# Patient Record
Sex: MALE | Race: BLACK OR AFRICAN AMERICAN | Employment: OTHER | URBAN - METROPOLITAN AREA
[De-identification: names, ages, dates, MRNs, and addresses within clinical notes are randomized per-mention and may not be internally consistent; named-entity substitution may affect disease eponyms.]

---

## 2017-03-15 RX ORDER — LOSARTAN POTASSIUM 50 MG/1
TABLET ORAL
Qty: 30 TAB | Refills: 11 | Status: SHIPPED | OUTPATIENT
Start: 2017-03-15 | End: 2018-03-15 | Stop reason: SDUPTHER

## 2017-04-24 ENCOUNTER — OFFICE VISIT (OUTPATIENT)
Dept: INTERNAL MEDICINE CLINIC | Age: 72
End: 2017-04-24

## 2017-04-24 VITALS
SYSTOLIC BLOOD PRESSURE: 133 MMHG | HEART RATE: 68 BPM | HEIGHT: 69 IN | OXYGEN SATURATION: 98 % | WEIGHT: 236 LBS | DIASTOLIC BLOOD PRESSURE: 68 MMHG | RESPIRATION RATE: 17 BRPM | TEMPERATURE: 97.7 F | BODY MASS INDEX: 34.96 KG/M2

## 2017-04-24 DIAGNOSIS — N40.1 BENIGN PROSTATIC HYPERPLASIA WITH LOWER URINARY TRACT SYMPTOMS, UNSPECIFIED MORPHOLOGY: ICD-10-CM

## 2017-04-24 DIAGNOSIS — E78.5 DYSLIPIDEMIA: ICD-10-CM

## 2017-04-24 DIAGNOSIS — I10 ESSENTIAL HYPERTENSION: ICD-10-CM

## 2017-04-24 DIAGNOSIS — Z00.00 ROUTINE GENERAL MEDICAL EXAMINATION AT A HEALTH CARE FACILITY: ICD-10-CM

## 2017-04-24 DIAGNOSIS — J44.9 CHRONIC OBSTRUCTIVE PULMONARY DISEASE, UNSPECIFIED COPD TYPE (HCC): ICD-10-CM

## 2017-04-24 DIAGNOSIS — R41.3 SHORT-TERM MEMORY LOSS: Primary | ICD-10-CM

## 2017-04-24 NOTE — MR AVS SNAPSHOT
Visit Information Date & Time Provider Department Dept. Phone Encounter #  
 4/24/2017  4:30 PM [facility] - [facility] [phone] [id number] Your Appointments 8/21/2017  4:45 PM  
Any with MD [facility] - [facility] ([facility]) Appt Note: 4 month f/u  
 [address], [facility] 260 [facility] 98  
  
   
 [address], [place] [address] [place] [address] Upcoming Health Maintenance Date Due DTaP/Tdap/Td series (1 - Tdap) 12/11/1964 Pneumococcal 65+ Low/Medium Risk (1 of 2 - PCV13) 12/11/2008 FOBT Q 1 YEAR AGE 50-75 6/1/2016 INFLUENZA AGE 9 TO ADULT 8/1/2016 MEDICARE YEARLY EXAM 2/8/2017 GLAUCOMA SCREENING Q2Y 6/1/2017 Allergies as of 4/24/2017  Review Complete On: 4/24/2017 By: Pap [place] No Known Allergies Current Immunizations  Reviewed on 2/22/2012 No immunizations on file. Not reviewed this visit You Were Diagnosed With   
  
 Codes Comments Essential hypertension    -  Primary ICD-10-CM: I10 
ICD-9-CM: 401.9 Chronic obstructive pulmonary disease, unspecified COPD type ([facility]     ICD-10-CM: J44.9 ICD-9-CM: 363 Short-term memory loss     ICD-10-CM: R41.3 ICD-9-CM: 780.93 Dyslipidemia     ICD-10-CM: E78.5 ICD-9-CM: 272.4 Prostatism     ICD-10-CM: N40.0 ICD-9-CM: 600.90 Benign prostatic hyperplasia with lower urinary tract symptoms, unspecified morphology     ICD-10-CM: N40.1 ICD-9-CM: 600.01 Vitals BP Pulse Temp Resp Height(growth percentile) Weight(growth percentile) 133/68 (BP 1 Location: Left arm, BP Patient Position: Sitting) 68 97.7 °F (36.5 °C) (Oral) 17 5' 9\" (1.753 m) 236 lb (107 kg) SpO2 BMI Smoking Status 98% 34.85 kg/m2 Current Some Day Smoker BMI and BSA Data Body Mass Index Body Surface Area 34.85 kg/m 2 2.28 m 2 Preferred Pharmacy Pharmacy Name Phone [id number] [address] [phone] Your Updated Medication List  
  
   
This list is accurate as of: 4/24/17  5:18 PM.  Always use your most recent med list.  
  
  
  
  
 aspirin 325 mg tablet Commonly known as:  ASPIRIN Take 325 mg by mouth daily. atorvastatin 80 mg tablet Commonly known as:  LIPITOR  
TAKE 1 TABLET BY MOUTH EVERY DAY  
  
 chlorhexidine 0.12 % solution Commonly known as:  PERIDEX  
  
 finasteride 5 mg tablet Commonly known as:  PROSCAR  
TAKE 1 TABLET BY MOUTH NIGHTLY  
  
 losartan 50 mg tablet Commonly known as:  COZAAR  
TAKE 1 TABLET BY MOUTH EVERY DAY  
  
 RAPAFLO 8 mg capsule Generic drug:  silodosin We Performed the Following AMB POC EKG ROUTINE W/ 12 LEADS, INTER & REP [03275 CPT(R)] APOLIPOPROTEIN B [id number] CPT(R)] CBC WITH AUTOMATED DIFF [69242 CPT(R)] LIPID PANEL [09287 CPT(R)] METABOLIC PANEL, COMPREHENSIVE [58860 CPT(R)] CT COLLECTION VENOUS BLOOD,VENIPUNCTURE [id number] CPT(R)] PROSTATE SPECIFIC AG (PSA) X5066677 CPT(R)] TSH 3RD GENERATION [99771 CPT(R)] URINALYSIS W/ RFLX MICROSCOPIC [61448 CPT(R)] VITAMIN B12 [id number] CPT(R)] Introducing [facility]! [place] introduces [employer] patient portal. Now you can access parts of your medical record, email your doctor's office, and request medication refills online. 1. In your internet browser, go to [email or web address] 2. Click on the First Time User? Click Here link in the Sign In box. You will see the New Member Sign Up page. 3. Enter your [employer] Access Code exactly as it appears below. You will not need to use this code after youve completed the sign-up process. If you do not sign up before the expiration date, you must request a new code. · [employer] Access Code: [email or web address] Expires: 7/23/2017  5:18 PM 
 
 4. Enter the last four digits of your Social Security Number (xxxx) and Date of Birth (mm/dd/yyyy) as indicated and click Submit. You will be taken to the next sign-up page. 5. Create a [employer] ID. This will be your [employer] login ID and cannot be changed, so think of one that is secure and easy to remember. 6. Create a [employer] password. You can change your password at any time. 7. Enter your Password Reset Question and Answer. This can be used at a later time if you forget your password. 8. Enter your e-mail address. You will receive e-mail notification when new information is available in [address] 9. Click Sign Up. You can now view and download portions of your medical record. 10. Click the Download Summary menu link to download a portable copy of your medical information. If you have questions, please visit the Frequently Asked Questions section of the [employer] website. Remember, [employer] is NOT to be used for urgent needs. For medical emergencies, dial 911. Now available from your iPhone and Android! Please provide this summary of care documentation to your next provider. Your primary care clinician is listed as Feli Marlow. If you have any questions after today's visit, please call [phone].

## 2017-04-24 NOTE — PROGRESS NOTES
1. Have you been to the ER, urgent care clinic since your last visit? Hospitalized since your last visit? No    2. Have you seen or consulted any other health care providers outside of the [address] [place] [address] since your last visit? Include any pap smears or colon screening.  No

## 2017-04-24 NOTE — PROGRESS NOTES
[address] [facility]  [facility]  [address] [facility] [address]  Phone:  [phone]  Fax: [phone]       Chief Complaint   Patient presents with    Physical     yearly visit   . SUBJECTIVE:    Dede Bautista is a 66 y.o. male comes in accompanied by his wife today. His concern is that of problems with his short term memory. His wife commented a similar fashion. He wants to know if this represents dementia. He is able to function independently. He also complains of occasional urinary incontinence. This is a problem he has had over the last several years. He was seeing a Urologist but he has since retired. He needs to follow-up with another urologist.     Unfortunately he continues to smoke cigarettes also. He has COPD. He has had no new neurological symptoms. He had a previous CVA with no sequelae. He has been taking his antihypertensive medication as prescribed as is the case with his statin. Current Outpatient Prescriptions   Medication Sig Dispense Refill    losartan (COZAAR) 50 mg tablet TAKE 1 TABLET BY MOUTH EVERY DAY 30 Tab 11    finasteride (PROSCAR) 5 mg tablet TAKE 1 TABLET BY MOUTH NIGHTLY 30 Tab 11    atorvastatin (LIPITOR) 80 mg tablet TAKE 1 TABLET BY MOUTH EVERY DAY 30 Tab 11    aspirin (ASPIRIN) 325 mg tablet Take 325 mg by mouth daily.       chlorhexidine (PERIDEX) 0.12 % solution   0    RAPAFLO 8 mg capsule        Past Medical History:   Diagnosis Date    Chronic obstructive pulmonary disease (HCC)     Hypercholesterolemia     Hypertension     Sarcoidosis, lung ([address]     Stroke ([facility]     CVA in distribution RMA---no sequelae    Thyroid disease      Past Surgical History:   Procedure Laterality Date    HX COLONOSCOPY       No Known Allergies      REVIEW OF SYSTEMS:  General: negative for - chills or fever  ENT: negative for - headaches, nasal congestion or tinnitus  Respiratory: negative for - cough, hemoptysis, shortness of breath or wheezing  Cardiovascular : negative for - chest pain, edema, palpitations or shortness of breath  Gastrointestinal: negative for - abdominal pain, blood in stools, heartburn or nausea/vomiting  Genito-Urinary: no dysuria, trouble voiding, or hematuria  Musculoskeletal: negative for - gait disturbance, joint pain, joint stiffness or joint swelling  Neurological: no TIA or stroke symptoms  Hematologic: no bruises, no bleeding, no swollen glands  Integument: no lumps, mole changes, nail changes or rash  Endocrine: no malaise/lethargy or unexpected weight changes      Social History     Social History    Marital status:      Spouse name: Piedad Calle    Number of children: N/A    Years of education: 3     Occupational History    Retired      Social History Main Topics    Smoking status: Current Some Day Smoker     Packs/day: 0.25    Smokeless tobacco: Never Used    Alcohol use Yes      Comment: Socially    Drug use: No    Sexual activity: Not Asked     Other Topics Concern    None     Social History Narrative     Family History   Problem Relation Age of Onset    Diabetes Mother     Diabetes Brother        OBJECTIVE:    Visit Vitals    /68 (BP 1 Location: Left arm, BP Patient Position: Sitting)    Pulse 68    Temp 97.7 °F (36.5 °C) (Oral)    Resp 17    Ht 5' 9\" (1.753 m)    Wt 236 lb (107 kg)    SpO2 98%    BMI 34.85 kg/m2     CONSTITUTIONAL: well , well nourished, appears age appropriate  EYES: perrla, eom intact  ENMT:moist mucous membranes, pharynx clear  NECK: supple. Thyroid normal  RESPIRATORY: Chest: clear to ascultation and percussion   CARDIOVASCULAR: Heart: regular rate and rhythm  GASTROINTESTINAL: Abdomen: soft, bowel sounds active  HEMATOLOGIC: no pathological lymph nodes palpated  MUSCULOSKELETAL: Extremities: no edema, pulse 1+   INTEGUMENT: No unusual rashes or suspicious skin lesions noted.  Nails appear normal.  NEUROLOGIC: non-focal exam   MENTAL STATUS: alert and oriented, appropriate affect  Rectal: No rectal masses, prostate 2+ firm and nontender, brown stool heme test negative      ASSESSMENT:  1. Short-term memory loss    2. Essential hypertension    3. Dyslipidemia    4. Chronic obstructive pulmonary disease, unspecified COPD type ([facility]    5. Benign prostatic hyperplasia with lower urinary tract symptoms, unspecified morphology    6. Routine general medical examination at a health care facility        PLAN:    1. As far as his short term memory deficit is concerned he needs to see a neurologist.  I do suspect this might represent early dementia. Appointment will be made. 2. Blood pressure is excellent today. No adjustment is made. 3. He will continue his statin as prescribed and efficacy and compliance will be assessed. 4. I encourage him to discontinue cigarette smoking. He does have COPD.   5. He will also have a urological appointment in view of his increased urinary frequency and near incontinence. .  Orders Placed This Encounter    APOLIPOPROTEIN B    CBC WITH AUTOMATED DIFF    LIPID PANEL    URINALYSIS W/ RFLX MICROSCOPIC    PROSTATE SPECIFIC AG    METABOLIC PANEL, COMPREHENSIVE    TSH 3RD GENERATION    VITAMIN B12    REFERRAL TO NEUROLOGY    REFERRAL TO UROLOGY    AMB POC EKG ROUTINE W/ 12 LEADS, INTER & REP         Follow-up Disposition:  Return in about 6 months (around 10/24/2017). Claudine Armas MD    This is a Subsequent Medicare Annual Wellness Visit providing Personalized Prevention Plan Services (PPPS) (Performed 12 months after initial AWV and PPPS )    I have reviewed the patient's medical history in detail and updated the computerized patient record.      History     Past Medical History:   Diagnosis Date    Chronic obstructive pulmonary disease ([facility]     Hypercholesterolemia     Hypertension     Sarcoidosis, lung ([facility]     Stroke ([facility]     CVA in distribution RMA---no sequelae    Thyroid disease Past Surgical History:   Procedure Laterality Date    HX COLONOSCOPY       Current Outpatient Prescriptions   Medication Sig Dispense Refill    losartan (COZAAR) 50 mg tablet TAKE 1 TABLET BY MOUTH EVERY DAY 30 Tab 11    finasteride (PROSCAR) 5 mg tablet TAKE 1 TABLET BY MOUTH NIGHTLY 30 Tab 11    atorvastatin (LIPITOR) 80 mg tablet TAKE 1 TABLET BY MOUTH EVERY DAY 30 Tab 11    aspirin (ASPIRIN) 325 mg tablet Take 325 mg by mouth daily.  chlorhexidine (PERIDEX) 0.12 % solution   0    RAPAFLO 8 mg capsule        No Known Allergies  Family History   Problem Relation Age of Onset    Diabetes Mother     Diabetes Brother      Social History   Substance Use Topics    Smoking status: Current Some Day Smoker     Packs/day: 0.25    Smokeless tobacco: Never Used    Alcohol use Yes      Comment: Socially     Patient Active Problem List   Diagnosis Code    [facility] disease) [facility] J44.9    Asthma J45.909    History of CVA (cerebrovascular accident) Z80.78    Dyslipidemia E78.5    Hypertension I10    Prostatism N40.0    Benign prostatic hyperplasia with lower urinary tract symptoms N40.1       Depression Risk Factor Screening:     PHQ 2 / 9, over the last two weeks 4/24/2017   Little interest or pleasure in doing things Not at all   Feeling down, depressed or hopeless Not at all   Total Score PHQ 2 0     Alcohol Risk Factor Screening: On any occasion during the past 3 months, have you had more than 4 drinks containing alcohol? Yes    Do you average more than 14 drinks per week? No      Functional Ability and Level of Safety:     Hearing Loss   none    Activities of Daily Living   Self-care. Requires assistance with: no ADLs    Fall Risk     Fall Risk Assessment, last 12 mths 4/24/2017   Able to walk? Yes   Fall in past 12 months? No     Abuse Screen   Patient is not abused    Review of Systems   A comprehensive review of systems was negative.     Physical Examination Evaluation of Cognitive Function:  Mood/affect:  neutral  Appearance: age appropriate  Family member/caregiver input: na    Visit Vitals    /68 (BP 1 Location: Left arm, BP Patient Position: Sitting)    Pulse 68    Temp 97.7 °F (36.5 °C) (Oral)    Resp 17    Ht 5' 9\" (1.753 m)    Wt 236 lb (107 kg)    SpO2 98%    BMI 34.85 kg/m2     General:  Alert, cooperative, no distress, appears stated age. Head:  Normocephalic, without obvious abnormality, atraumatic. Eyes:  Conjunctivae/corneas clear. PERRL, EOMs intact. Fundi benign   Ears:  Normal TMs and external ear canals both ears. Nose: Nares normal. Septum midline. Mucosa normal. No drainage or sinus tenderness. Throat: Lips, mucosa, and tongue normal. Teeth and gums normal.   Neck: Supple, symmetrical, trachea midline, no adenopathy, thyroid: no enlargement/tenderness/nodules, no carotid bruit and no JVD. Back:   Symmetric, no curvature. ROM normal. No CVA tenderness. Lungs:   Clear to auscultation bilaterally. Chest wall:  No tenderness or deformity. Heart:  Regular rate and rhythm, S1, S2 normal, no murmur, click, rub or gallop. Abdomen:   Soft, non-tender. Bowel sounds normal. No masses,  No organomegaly. Genitalia:   none done   Rectal:  Normal tone, normal prostate, no masses or tenderness  Guaiac negative stool. Extremities: Extremities normal, atraumatic, no cyanosis or edema. Pulses: 2+ and symmetric all extremities. Skin: Skin color, texture, turgor normal. No rashes or lesions   Lymph nodes: Cervical, supraclavicular, and axillary nodes normal.   Neurologic: CNII-XII intact. Normal strength, sensation and reflexes throughout. Patient Care Team:  Alyce Tubbs MD as PCP - General (Internal Medicine)    Advice/Referrals/Counseling   Education and counseling provided:  Are appropriate based on today's review and evaluation      Assessment/Plan       ICD-10-CM ICD-9-CM    1.  Short-term memory loss R41.3 780.93 VITAMIN B12      REFERRAL TO NEUROLOGY   2. Essential hypertension I10 401.9 AMB POC EKG ROUTINE W/ 12 LEADS, INTER & REP      APOLIPOPROTEIN B      CBC WITH AUTOMATED DIFF      LIPID PANEL      URINALYSIS W/ RFLX MICROSCOPIC      NM COLLECTION VENOUS BLOOD,VENIPUNCTURE      METABOLIC PANEL, COMPREHENSIVE      TSH 3RD GENERATION   3. Dyslipidemia E78.5 272.4    4. Chronic obstructive pulmonary disease, unspecified COPD type ([facility] J44.9 496    5. Benign prostatic hyperplasia with lower urinary tract symptoms, unspecified morphology N40.1 600.01 PROSTATE SPECIFIC AG (PSA)      REFERRAL TO UROLOGY   6. Routine general medical examination at a health care facility Z00.00 V70.0    .

## 2017-04-26 LAB
ALBUMIN SERPL-MCNC: 4.2 G/DL (ref 3.5–4.8)
ALBUMIN/GLOB SERPL: 1.6 {RATIO} (ref 1.2–2.2)
ALP SERPL-CCNC: 78 IU/L (ref 39–117)
ALT SERPL-CCNC: 25 IU/L (ref 0–44)
APO B SERPL-MCNC: 101 MG/DL (ref 52–135)
APPEARANCE UR: CLEAR
AST SERPL-CCNC: 22 IU/L (ref 0–40)
BACTERIA #/AREA URNS HPF: ABNORMAL /[HPF]
BASOPHILS # BLD AUTO: 0 X10E3/UL (ref 0–0.2)
BASOPHILS NFR BLD AUTO: 0 %
BILIRUB SERPL-MCNC: 0.9 MG/DL (ref 0–1.2)
BILIRUB UR QL STRIP: NEGATIVE
BUN SERPL-MCNC: 20 MG/DL (ref 8–27)
BUN/CREAT SERPL: 22 (ref 10–24)
CALCIUM SERPL-MCNC: 9.4 MG/DL (ref 8.6–10.2)
CASTS URNS QL MICRO: ABNORMAL /LPF
CHLORIDE SERPL-SCNC: 104 MMOL/L (ref 96–106)
CHOLEST SERPL-MCNC: 165 MG/DL (ref 100–199)
CO2 SERPL-SCNC: 20 MMOL/L (ref 18–29)
COLOR UR: YELLOW
CREAT SERPL-MCNC: 0.92 MG/DL (ref 0.76–1.27)
EOSINOPHIL # BLD AUTO: 0.5 X10E3/UL (ref 0–0.4)
EOSINOPHIL NFR BLD AUTO: 6 %
EPI CELLS #/AREA URNS HPF: ABNORMAL /HPF
ERYTHROCYTE [DISTWIDTH] IN BLOOD BY AUTOMATED COUNT: 14.7 % (ref 12.3–15.4)
GLOBULIN SER CALC-MCNC: 2.7 G/DL (ref 1.5–4.5)
GLUCOSE SERPL-MCNC: 93 MG/DL (ref 65–99)
GLUCOSE UR QL: NEGATIVE
HCT VFR BLD AUTO: 40.1 % (ref 37.5–51)
HDLC SERPL-MCNC: 43 MG/DL
HGB BLD-MCNC: 12.8 G/DL (ref 12.6–17.7)
HGB UR QL STRIP: ABNORMAL
IMM GRANULOCYTES # BLD: 0 X10E3/UL (ref 0–0.1)
IMM GRANULOCYTES NFR BLD: 0 %
KETONES UR QL STRIP: NEGATIVE
LDLC SERPL CALC-MCNC: 104 MG/DL (ref 0–99)
LEUKOCYTE ESTERASE UR QL STRIP: ABNORMAL
LYMPHOCYTES # BLD AUTO: 2.4 X10E3/UL (ref 0.7–3.1)
LYMPHOCYTES NFR BLD AUTO: 26 %
MCH RBC QN AUTO: 26.7 PG (ref 26.6–33)
MCHC RBC AUTO-ENTMCNC: 31.9 G/DL (ref 31.5–35.7)
MCV RBC AUTO: 84 FL (ref 79–97)
MICRO URNS: ABNORMAL
MONOCYTES # BLD AUTO: 0.7 X10E3/UL (ref 0.1–0.9)
MONOCYTES NFR BLD AUTO: 8 %
MUCOUS THREADS URNS QL MICRO: PRESENT
NEUTROPHILS # BLD AUTO: 5.7 X10E3/UL (ref 1.4–7)
NEUTROPHILS NFR BLD AUTO: 60 %
NITRITE UR QL STRIP: NEGATIVE
PH UR STRIP: 6 [PH] (ref 5–7.5)
PLATELET # BLD AUTO: 226 X10E3/UL (ref 150–379)
POTASSIUM SERPL-SCNC: 4.2 MMOL/L (ref 3.5–5.2)
PROT SERPL-MCNC: 6.9 G/DL (ref 6–8.5)
PROT UR QL STRIP: NEGATIVE
PSA SERPL-MCNC: 1.4 NG/ML (ref 0–4)
RBC # BLD AUTO: 4.79 X10E6/UL (ref 4.14–5.8)
RBC #/AREA URNS HPF: ABNORMAL /HPF
SODIUM SERPL-SCNC: 142 MMOL/L (ref 134–144)
SP GR UR: 1.03 (ref 1–1.03)
TRIGL SERPL-MCNC: 90 MG/DL (ref 0–149)
TSH SERPL DL<=0.005 MIU/L-ACNC: 2.01 UIU/ML (ref 0.45–4.5)
UROBILINOGEN UR STRIP-MCNC: 0.2 MG/DL (ref 0.2–1)
VIT B12 SERPL-MCNC: 436 PG/ML (ref 211–946)
VLDLC SERPL CALC-MCNC: 18 MG/DL (ref 5–40)
WBC # BLD AUTO: 9.4 X10E3/UL (ref 3.4–10.8)
WBC #/AREA URNS HPF: ABNORMAL /HPF

## 2017-05-01 ENCOUNTER — OFFICE VISIT (OUTPATIENT)
Dept: NEUROLOGY | Age: 72
End: 2017-05-01

## 2017-05-01 VITALS
RESPIRATION RATE: 18 BRPM | SYSTOLIC BLOOD PRESSURE: 138 MMHG | BODY MASS INDEX: 35.44 KG/M2 | WEIGHT: 240 LBS | DIASTOLIC BLOOD PRESSURE: 80 MMHG

## 2017-05-01 DIAGNOSIS — F03.90 DEMENTIA WITHOUT BEHAVIORAL DISTURBANCE, UNSPECIFIED DEMENTIA TYPE: Primary | ICD-10-CM

## 2017-05-01 RX ORDER — DONEPEZIL HYDROCHLORIDE 5 MG/1
5 TABLET, FILM COATED ORAL
Qty: 30 TAB | Refills: 2 | Status: SHIPPED | OUTPATIENT
Start: 2017-05-01 | End: 2017-07-13 | Stop reason: DRUGHIGH

## 2017-05-01 RX ORDER — LANOLIN ALCOHOL/MO/W.PET/CERES
1000 CREAM (GRAM) TOPICAL DAILY
Qty: 60 TAB | Refills: 2 | Status: SHIPPED | OUTPATIENT
Start: 2017-05-01 | End: 2017-07-13 | Stop reason: SDUPTHER

## 2017-05-01 NOTE — PATIENT INSTRUCTIONS

## 2017-05-01 NOTE — PROGRESS NOTES
NEUROSCIENCE [place]   NEW PATIENT EVALUATION/CONSULTATION       PATIENT NAME: Jozef Florez    MRN: [id number]    REASON FOR CONSULTATION: Memory impairment    05/01/17      Previous records (physician notes, laboratory reports, and radiology reports) and imaging studies were reviewed and summarized. My recommendations will be communicated back to the patient's physician(s) via electronic medical record and/or by [address] mail. HISTORY OF PRESENT ILLNESS:  Jozef Florez is a 66 y.o. right handed male presenting for evaluation of memory impairment.       Onset and progression: 2 years, slightly worse since onset    Neuropsychiatric symptoms     Problems with judgment:No   Reduced interest in hobbies/activities: No   Repeats questions, stories, or statements: No    Trouble recalling people's names: Yes   Trouble learning how to use a tool or appliance: No   Forgetting the correct month or year: Yes   Difficulty handling financial affairs (bill-paying, taxes): Yes   Difficulty remembering appointments:Yes    Memory: forgetting where he is at, where he is placing objects, navigation  Language: some difficulty with naming objects  Change in personality: none  Change in eating habits: none  Physical changes: none  Depressive symptoms: denies  Hallucinations/Delusions: none    Ability to function:  Driving: yes, rides motorcycles, some difficulty with navigation  Finances: some difficulty remembering to pay bills on time  Manages own medication: yes  Resides: at home with his wife  Fhx dementia: +father    Prior work-up: MRI Brain, head CT    Prior treatments: none      PAST MEDICAL HISTORY:  Past Medical History:   Diagnosis Date    [facility]     Hypercholesterolemia     Hypertension     Sarcoidosis, lung ([facility]     Stroke ([facility]     CVA in distribution RMA---no sequelae    Thyroid disease        PAST SURGICAL HISTORY:  Past Surgical History:   Procedure Laterality Date    HX COLONOSCOPY         FAMILY HISTORY:   Family History   Problem Relation Age of Onset    Diabetes Mother     Diabetes Brother          SOCIAL HISTORY:  Social History     Social History    Marital status:      Spouse name: Amelia Olson    Number of children: N/A    Years of education: 3     Occupational History    Retired      Social History Main Topics    Smoking status: Current Some Day Smoker     Packs/day: 0.25    Smokeless tobacco: Never Used    Alcohol use Yes      Comment: Socially    Drug use: No    Sexual activity: Not Asked     Other Topics Concern    None     Social History Narrative         MEDICATIONS:   Current Outpatient Prescriptions   Medication Sig Dispense Refill    losartan (COZAAR) 50 mg tablet TAKE 1 TABLET BY MOUTH EVERY DAY 30 Tab 11    finasteride (PROSCAR) 5 mg tablet TAKE 1 TABLET BY MOUTH NIGHTLY 30 Tab 11    atorvastatin (LIPITOR) 80 mg tablet TAKE 1 TABLET BY MOUTH EVERY DAY 30 Tab 11    aspirin (ASPIRIN) 325 mg tablet Take 325 mg by mouth daily.  chlorhexidine (PERIDEX) 0.12 % solution   0    RAPAFLO 8 mg capsule            ALLERGIES:  No Known Allergies      REVIEW OF SYSTEMS:  10 point ROS reviewed with patient. Please see scanned document under media. PHYSICAL EXAM:  Vital Signs:   Visit Vitals    /80    Resp 18    Wt 108.9 kg (240 lb)    BMI 35.44 kg/m2        General Medical Exam:  General:  Well appearing, comfortable, in no apparent distress. Eyes/ENT: see cranial nerve examination. Neck: No masses appreciated. Full range of motion without tenderness. Respiratory:  Clear to auscultation, good air entry bilaterally. Cardiac:  Regular rate and rhythm, no murmur. GI:  Soft, non-tender, non-distended abdomen. Bowel sounds normal. No masses, organomegaly. Extremities:  No deformities, edema, or skin discoloration. Skin:  No rashes or lesions.     Neurological:  · Mental Status:  Alert and oriented to person, place, and time with fluent speech. · MOCA: 15/30 (see scanned media)  · Cranial Nerves:   CNII/III/IV/VI: visual fields full to confrontation, EOMI, PERRL, no ptosis or nystagmus. CN V: Facial sensation intact bilaterally, masseter 5/5   CN VII: Facial muscles symmetric and strong   CN VIII: Hears finger rub well bilaterally, intact vestibular function   CN IX/X: Normal palatal movement   CN XI: Full strength shoulder shrug bilaterally   CN XII: Tongue protrusion full and midline without fasciculation or atrophy  · Motor: Normal tone and muscle bulk with no pronator drift. No atrophy or fasciculations present on examination. Individual muscle group testing:  Shoulder abduction:   Left:5/5   Right : 5/5    Shoulder adduction:   Left:5/5   Right : 5/5    Elbow flexion:      Left:5/5   Right : 5/5  Elbow extension:    Left:5/5   Right : 5/5   Wrist flexion:    Left:5/5   Right : 5/5  Wrist extension:    Left:5/5   Right : 5/5  Arm pronation:   Left:5/5   Right : 5/5  Arm supination:   Left:5/5   Right : 5/5    Finger flexion:    Left:5/5   Right : 5/5    Finger extension:   Left:5/5   Right : 5/5   Finger abduction:  Left:5/5   Right : 5/5   Finger adduction:   Left:5/5   Right : 5/5  Hip flexion:     Left:5/5   Right : 5/5         Hip extension:   Left:5/5   Right : 5/5    Knee flexion:    Left:5/5   Right : 5/5    Knee extension:   Left:5/5   Right : 5/5    Dorsiflexion:     Left:5/5   Right : 5/5  Plantar flexion:    Left:5/5   Right : 5/5      · MSRs: No crossed adductors or clonus. RIGHT  LEFT   Brachioradialis 1+ 1+   Biceps 1+ 1+   Triceps 1+ 1+   Knee 2+ 1+   Achilles 2+ 1+        Plantar response Downward Downward          · Sensation: Normal and symmetric perception of pinprick, temperature, light touch, proprioception, and vibration; (-) Romberg. · Coordination: No dysmetria. Normal rapid alternating movements; finger-to-nose and heel-to- shin testing are within normal limits.   · Gait: Normal native gait    PERTINENT DATA:  INTERNAL RECORDS:  The patient's electronic medical record was reviewed. The relevant details include:  Component      Latest Ref Rng & Units 4/24/2017 4/24/2017           5:06 PM  5:06 PM   TSH      0.450 - 4.500 uIU/mL  2.010   Vitamin B12      211 - 946 pg/mL 436          CT Results (maximum last 3): Results from [place] encounter on 02/22/12   CT HEAD WITHOUT CONTRAST   Narrative **Final Report**      ICD Codes / Adm. Diagnosis: 52  465.9 / Headache    Examination:  CT HEAD WO CON  - [id number] - Feb 22 2012  7:44AM  Accession No:  [id number]  Reason:  headache, history of CVA      REPORT:  INDICATION:    headache, history of CVA     COMPARISON: None. TECHNIQUE: Unenhanced CT of the head was performed using 5 mm images. Brain   and bone windows were generated. FINDINGS:  The ventricles and sulci are within normal limits. There is a small   low-density in the left frank and possibly right thalamus consistent with   small vessel ischemic changes. No hemorrhage mass or acute infarction is   identified. Bony structures are unremarkable. IMPRESSION:  1. No acute process. There is a small low density in the left frank and   possibly right thalamus may represent small vessel ischemic changes. Signing/Reading Doctor: Anthony Gutiérrez ([id number])    Approved: Anthony Gutiérrez ([id number])  02/22/2012                                      MRI Results (maximum last 3): Results from [place] encounter on 06/06/11   MRI BRAIN WITHOUT CONTRAST   Narrative **Final Report**      ICD Codes / Adm. Diagnosis: 434.91  401.9 / HYPERTENSION  CVA (CEREBRAL   VASCULAR ACCIDEN  Examination:  MR BRAIN WO CON  - [id number] - Jun 6 2011  7:13AM  Accession No:  [id number]  Reason:  hypertension, cva      REPORT:  INDICATION:   hypertension, cva     EXAMINATION:  MRI BRAIN WO CONTRAST    COMPARISON:  None    TECHNIQUE:  MR imaging of the brain was performed with sagittal T1, axial   T1, T2, FLAIR, GRE, DWI/ADC, coronal T2.    FINDINGS:      There is moderate  global volume loss resulting in prominence of the sulci,   cisterns and ventricles without hydrocephalus or midline shift. There is no   acute intra or extra-axial fluid collection. There is mild chronic   microvascular ischemic disease in the periventricular and subcortical   regions of the cerebral hemispheres. There is a small remote lacunar   infarction in the right thalamus. There is a small oval-shaped focus of T2   hyperintensity in the left frank, with associated signal increase on   diffusion but equivocal diffusion reduction on the ADC map. The major   intracranial vascular flow-voids are patent. IMPRESSION:  Mild chronic microvascular ischemic disease with small area of diffusion   signal abnormality in the left frank, suggestive of acute/subacute   infarction. Followup as clinically indicated. The findings were called to Dr. Janneth Garcia on 6/6/11 at 10 a.m. by myself. 23x          Signing/Reading Doctor: Kaela Thomas ([id number])  Transcribed: n/a on 06/06/2011  Approved: Kaela Thomas ([id number])  06/06/2011          Distribution:                ASSESSMENT:      ICD-10-CM ICD-9-CM    1. Dementia without behavioral disturbance, unspecified dementia type F03.90 294.20 donepezil (ARICEPT) 5 mg tablet      cyanocobalamin (VITAMIN B12) 500 mcg tablet      MRI BRAIN WO CONT   66year old AAM presenting for evaluation of cognitive impairment x 2 years with progression. MOCA 15/30 with significantly impaired visuospatial/executive functioning, attention, delayed recall. Probable moderate AD w/o behavioral disturbance. Will obtain updated neuroimaging to exclude comorbid vascular dementia given h/o stroke. Discussed need for assistance moving forward when making executive decisions, assistance with medications (using a pill box), assistance with navigation and avoidance of traveling alone.   Discussed treatment options in detail including risks/benefits and expectations. While medication is not likely to made a \"night and day\" difference in AD, it may aid modestly in improving concentration and attention. Medication titration and addition of adjunctive agents may be necessary to achieve maximum benefit. Explained that AD is a progressive disease process. While he is not currently exhibiting destructive, paranoid or psychotic behavior, if this becomes an issue in the future we can certainly address this with appropriate medication. PLAN:  MRI Brain WO  Start Aricept 5mg daily  Start B12 supplementation 1000mcg daily  Heart healthy diet and exercise  Regular scheduled cognitive and social engagement. Follow-up Disposition:  Return in about 2 months (around 7/1/2017). Neda Martinez,   Staff Neurologist  Diplomate, American Board of Psychiatry & Neurology       CC Referring provider:    Angeles De Jesus MD

## 2017-05-01 NOTE — MR AVS SNAPSHOT
Visit Information Date & Time Provider Department Dept. Phone Encounter #  
 5/1/2017  1:00 PM [facility] [address] [id number] Follow-up Instructions Return in about 2 months (around 7/1/2017). Your Appointments 8/21/2017  4:45 PM  
Any with David Cintron MD  
SPORTS MED AND PRIMARY CARE - Arnie Adams[facility] Appt Note: 4 month f/u  
 [address], [address] [facility]  
  
   
 [address], [place] [address] Upcoming Health Maintenance Date Due DTaP/Tdap/Td series (1 - Tdap) 12/11/1966 Pneumococcal 65+ Low/Medium Risk (1 of 2 - PCV13) 12/11/2008 FOBT Q 1 YEAR AGE 50-75 6/1/2016 GLAUCOMA SCREENING Q2Y 6/1/2017 INFLUENZA AGE 9 TO ADULT 8/1/2017 MEDICARE YEARLY EXAM 4/25/2018 Allergies as of 5/1/2017  Review Complete On: 5/1/2017 By: Jean Verdugo, DO No Known Allergies Current Immunizations  Reviewed on 2/22/2012 No immunizations on file. Not reviewed this visit You Were Diagnosed With   
  
 Codes Comments Dementia without behavioral disturbance, unspecified dementia type    -  Primary ICD-10-CM: F03.90 ICD-9-CM: 294.20 Vitals BP Resp Weight(growth percentile) BMI Smoking Status 138/80 18 240 lb (108.9 kg) 35.44 kg/m2 Current Some Day Smoker BMI and BSA Data Body Mass Index Body Surface Area  
 35.44 kg/m 2 2.3 m 2 Preferred Pharmacy Pharmacy Name Phone [id number] [address] [phone] Your Updated Medication List  
  
   
This list is accurate as of: 5/1/17  1:42 PM.  Always use your most recent med list.  
  
  
  
  
 aspirin 325 mg tablet Commonly known as:  ASPIRIN Take 325 mg by mouth daily. atorvastatin 80 mg tablet Commonly known as:  LIPITOR TAKE 1 TABLET BY MOUTH EVERY DAY  
  
 chlorhexidine 0.12 % solution Commonly known as:  PERIDEX  
  
 cyanocobalamin 500 mcg tablet Commonly known as:  VITAMIN B12 Take 2 Tabs by mouth daily. donepezil 5 mg tablet Commonly known as:  ARICEPT Take 1 Tab by mouth nightly. finasteride 5 mg tablet Commonly known as:  PROSCAR  
TAKE 1 TABLET BY MOUTH NIGHTLY  
  
 losartan 50 mg tablet Commonly known as:  COZAAR  
TAKE 1 TABLET BY MOUTH EVERY DAY  
  
 RAPAFLO 8 mg capsule Generic drug:  silodosin Prescriptions Sent to Pharmacy Refills  
 donepezil (ARICEPT) 5 mg tablet 2 Sig: Take 1 Tab by mouth nightly. Class: Normal  
 [facility] [address] Ph #: [phone] Route: Oral  
 cyanocobalamin (VITAMIN B12) 500 mcg tablet 2 Sig: Take 2 Tabs by mouth daily. Class: Normal  
 Pharmacy: [address] Ph #: [phone] Route: Oral  
  
Follow-up Instructions Return in about 2 months (around 7/1/2017). To-Do List   
 05/01/2017 Imaging:  MRI BRAIN WO CONT Patient Instructions A Healthy Lifestyle: Care Instructions Your Care Instructions A healthy lifestyle can help you feel good, stay at a healthy weight, and have plenty of energy for both work and play. A healthy lifestyle is something you can share with your whole family. A healthy lifestyle also can lower your risk for serious health problems, such as high blood pressure, heart disease, and diabetes. You can follow a few steps listed below to improve your health and the health of your family. Follow-up care is a key part of your treatment and safety. Be sure to make and go to all appointments, and call your doctor if you are having problems.  Its also a good idea to know your test results and keep a list of the medicines you take. How can you care for yourself at home? · Do not eat too much sugar, fat, or fast foods. You can still have dessert and treats now and then. The goal is moderation. · Start small to improve your eating habits. Pay attention to portion sizes, drink less juice and soda pop, and eat more fruits and vegetables. ¨ Eat a healthy amount of food. A 3-ounce serving of meat, for example, is about the size of a deck of cards. Fill the rest of your plate with vegetables and whole grains. ¨ Limit the amount of soda and sports drinks you have every day. Drink more water when you are thirsty. ¨ Eat at least 5 servings of fruits and vegetables every day. It may seem like a lot, but it is not hard to reach this goal. A serving or helping is 1 piece of fruit, 1 cup of vegetables, or 2 cups of leafy, raw vegetables. Have an apple or some carrot sticks as an afternoon snack instead of a candy bar. Try to have fruits and/or vegetables at every meal. 
· Make exercise part of your daily routine. You may want to start with simple activities, such as walking, bicycling, or slow swimming. Try to be active 30 to 60 minutes every day. You do not need to do all 30 to 60 minutes all at once. For example, you can exercise 3 times a day for 10 or 20 minutes. Moderate exercise is safe for most people, but it is always a good idea to talk to your doctor before starting an exercise program. 
· Keep moving. Jamin Trammell the lawn, work in the garden, or HESIODO. Take the stairs instead of the elevator at work. · If you smoke, quit. People who smoke have an increased risk for heart attack, stroke, cancer, and other lung illnesses. Quitting is hard, but there are ways to boost your chance of quitting tobacco for good. ¨ Use nicotine gum, patches, or lozenges. ¨ Ask your doctor about stop-smoking programs and medicines. ¨ Keep trying.  
In addition to reducing your risk of diseases in the future, you will notice some benefits soon after you stop using tobacco. If you have shortness of breath or asthma symptoms, they will likely get better within a few weeks after you quit. · Limit how much alcohol you drink. Moderate amounts of alcohol (up to 2 drinks a day for men, 1 drink a day for women) are okay. But drinking too much can lead to liver problems, high blood pressure, and other health problems. Family health If you have a family, there are many things you can do together to improve your health. · Eat meals together as a family as often as possible. · Eat healthy foods. This includes fruits, vegetables, lean meats and dairy, and whole grains. · Include your family in your fitness plan. Most people think of activities such as jogging or tennis as the way to fitness, but there are many ways you and your family can be more active. Anything that makes you breathe hard and gets your heart pumping is exercise. Here are some tips: 
¨ Walk to do errands or to take your child to school or the bus. ¨ Go for a family bike ride after dinner instead of watching TV. Where can you learn more? Go to http://altafHammer [employer] Chisellogan.info/. Enter H891 in the search box to learn more about \"A Healthy Lifestyle: Care Instructions. \" Current as of: July 26, 2016 Content Version: 11.2 © 2973-2692 [employer]. Care instructions adapted under license by [employer] (which disclaims liability or warranty for this information). If you have questions about a medical condition or this instruction, always ask your healthcare professional. [address] any warranty or liability for your use of this information. Introducing [facility]! Kaushal Rodriguez introduces [employer] patient portal. Now you can access parts of your medical record, email your doctor's office, and request medication refills online.    
 
1. In your internet browser, go to [email or web address]. [email or web address] 2. Click on the First Time User? Click Here link in the Sign In box. You will see the New Member Sign Up page. 3. Enter your [employer] Access Code exactly as it appears below. You will not need to use this code after youve completed the sign-up process. If you do not sign up before the expiration date, you must request a new code. · [employer] Access Code: [email or web address] Expires: 7/23/2017  5:18 PM 
 
4. Enter the last four digits of your Social Security Number (xxxx) and Date of Birth (mm/dd/yyyy) as indicated and click Submit. You will be taken to the next sign-up page. 5. Create a Baboomt ID. This will be your Vol[employer]is login ID and cannot be changed, so think of one that is secure and easy to remember. 6. Create a [employer] password. You can change your password at any time. 7. Enter your Password Reset Question and Answer. This can be used at a later time if you forget your password. 8. Enter your e-mail address. You will receive e-mail notification when new information is available in [address] 9. Click Sign Up. You can now view and download portions of your medical record. 10. Click the Download Summary menu link to download a portable copy of your medical information. If you have questions, please visit the Frequently Asked Questions section of the [employer] website. Remember, [employer] is NOT to be used for urgent needs. For medical emergencies, dial 911. Now available from your iPhone and Android! Please provide this summary of care documentation to your next provider. Your primary care clinician is listed as Feli Marlow. If you have any questions after today's visit, please call [phone].

## 2017-05-03 NOTE — TELEPHONE ENCOUNTER
Patient notified of uti found in urine and per Dr. Shayan Travis patient to start cipro 500mg 1 bidx 14 days then return to repeat the urine.

## 2017-05-04 RX ORDER — CIPROFLOXACIN 500 MG/1
500 TABLET ORAL 2 TIMES DAILY
Qty: 28 TAB | Refills: 0 | Status: SHIPPED | OUTPATIENT
Start: 2017-05-04 | End: 2017-05-18

## 2017-05-10 ENCOUNTER — HOSPITAL ENCOUNTER (OUTPATIENT)
Dept: MRI IMAGING | Age: 72
Discharge: HOME | End: 2017-05-10
Attending: PSYCHIATRY & NEUROLOGY
Payer: MEDICARE

## 2017-05-10 DIAGNOSIS — F03.90 DEMENTIA WITHOUT BEHAVIORAL DISTURBANCE, UNSPECIFIED DEMENTIA TYPE: ICD-10-CM

## 2017-05-10 PROCEDURE — 70551 MRI BRAIN STEM W/O DYE: CPT

## 2017-06-26 RX ORDER — ATORVASTATIN CALCIUM 80 MG/1
TABLET, FILM COATED ORAL
Qty: 30 TAB | Refills: 11 | Status: SHIPPED | OUTPATIENT
Start: 2017-06-26 | End: 2018-05-03 | Stop reason: SDUPTHER

## 2017-07-13 ENCOUNTER — OFFICE VISIT (OUTPATIENT)
Dept: NEUROLOGY | Age: 72
End: 2017-07-13

## 2017-07-13 VITALS
BODY MASS INDEX: 33.97 KG/M2 | OXYGEN SATURATION: 98 % | RESPIRATION RATE: 18 BRPM | SYSTOLIC BLOOD PRESSURE: 126 MMHG | HEART RATE: 78 BPM | DIASTOLIC BLOOD PRESSURE: 90 MMHG | WEIGHT: 230 LBS

## 2017-07-13 DIAGNOSIS — I10 ESSENTIAL HYPERTENSION: ICD-10-CM

## 2017-07-13 DIAGNOSIS — F03.90 DEMENTIA WITHOUT BEHAVIORAL DISTURBANCE, UNSPECIFIED DEMENTIA TYPE: ICD-10-CM

## 2017-07-13 DIAGNOSIS — Z86.73 REMOTE HISTORY OF STROKE: ICD-10-CM

## 2017-07-13 DIAGNOSIS — Z72.0 TOBACCO ABUSE: ICD-10-CM

## 2017-07-13 DIAGNOSIS — G30.1 LATE ONSET ALZHEIMER'S DISEASE WITHOUT BEHAVIORAL DISTURBANCE (HCC): Primary | ICD-10-CM

## 2017-07-13 DIAGNOSIS — F02.80 LATE ONSET ALZHEIMER'S DISEASE WITHOUT BEHAVIORAL DISTURBANCE (HCC): Primary | ICD-10-CM

## 2017-07-13 DIAGNOSIS — E78.5 DYSLIPIDEMIA: ICD-10-CM

## 2017-07-13 RX ORDER — LANOLIN ALCOHOL/MO/W.PET/CERES
1000 CREAM (GRAM) TOPICAL DAILY
Qty: 60 TAB | Refills: 5 | Status: SHIPPED | OUTPATIENT
Start: 2017-07-13

## 2017-07-13 RX ORDER — DONEPEZIL HYDROCHLORIDE 10 MG/1
10 TABLET, FILM COATED ORAL
Qty: 30 TAB | Refills: 5 | Status: SHIPPED | OUTPATIENT
Start: 2017-07-13 | End: 2018-08-15

## 2017-07-13 NOTE — PROGRESS NOTES
Neurology Clinic Follow up Note    Patient ID:  Shy Najera  [id number]  1 y.o.  1945      Mr. Judy Dey is here for follow up today of  Chief Complaint   Patient presents with    Memory Loss          Last Appointment With Me:  5/1/2017       Interval History:   Pt returns for f/u of memory impairment. He feels memory is stable. Wife reports some intermittent agitation. Tolerating Aricept low dose. No reported side effects. He was taking B12 supplementation but ran out of his prescription. Ability to function:  Driving: yes, rides motorcycles, some difficulty with navigation  Finances: Wife has taken over paying bills for the most part  Manages own medication: not using pill box, wife is assisting when needed  Resides: at home with his wife  Fhx dementia: +father      PMHx/ PSHx/ FHx/ SHx:  Reviewed and unchanged previous visit. Past Medical History:   Diagnosis Date    [facility] disease ([facility]     Hypercholesterolemia     Hypertension     Sarcoidosis, lung ([facility]     Stroke ([facility]     CVA in distribution RMA---no sequelae    Thyroid disease          ROS:  Comprehensive review of systems negative except for as noted above. Objective:       Meds:  Current Outpatient Prescriptions   Medication Sig Dispense Refill    atorvastatin (LIPITOR) 80 mg tablet TAKE 1 TABLET BY MOUTH EVERY DAY 30 Tab 11    donepezil (ARICEPT) 5 mg tablet Take 1 Tab by mouth nightly. 30 Tab 2    cyanocobalamin (VITAMIN B12) 500 mcg tablet Take 2 Tabs by mouth daily. 60 Tab 2    losartan (COZAAR) 50 mg tablet TAKE 1 TABLET BY MOUTH EVERY DAY 30 Tab 11    finasteride (PROSCAR) 5 mg tablet TAKE 1 TABLET BY MOUTH NIGHTLY 30 Tab 11    aspirin (ASPIRIN) 325 mg tablet Take 325 mg by mouth daily.       chlorhexidine (PERIDEX) 0.12 % solution   0    RAPAFLO 8 mg capsule          Exam:  Visit Vitals    /90    Pulse 78    Resp 18    Wt 104.3 kg (230 lb)    SpO2 98%    BMI 33.97 kg/m2 NEUROLOGICAL EXAM:  General: Awake, alert, speech fluent  CN: PERRL, EOMI without nystagmus, VFF to confrontation, facial sensation and strength are normal and symmetric, hearing is intact to finger rub bilaterally, palate and tongue movements are intact and symmetric. Motor: Normal tone, bulk and strength bilaterally. Reflexes: 1/4 and symmetric, plantar stimulation is flexor. Coordination: FNF, GI, HTS intact. Sensation: LT intact throughout. Gait: Normal-based and steady. Lab data was reviewed. Radiology images were independently viewed and available reports were reviewed. LABS  Results for orders placed or performed in visit on 04/24/17   APOLIPOPROTEIN B   Result Value Ref Range    Apolipoprotein B 101 52 - 135 mg/dL   CBC WITH AUTOMATED DIFF   Result Value Ref Range    WBC 9.4 3.4 - 10.8 x10E3/uL    RBC 4.79 4.14 - 5.80 x10E6/uL    HGB 12.8 12.6 - 17.7 g/dL    HCT 40.1 37.5 - 51.0 %    MCV 84 79 - 97 fL    MCH 26.7 26.6 - 33.0 pg    MCHC 31.9 31.5 - 35.7 g/dL    RDW 14.7 12.3 - 15.4 %    PLATELET 308 456 - 619 x10E3/uL    NEUTROPHILS 60 %    Lymphocytes 26 %    MONOCYTES 8 %    EOSINOPHILS 6 %    BASOPHILS 0 %    ABS. NEUTROPHILS 5.7 1.4 - 7.0 x10E3/uL    Abs Lymphocytes 2.4 0.7 - 3.1 x10E3/uL    ABS. MONOCYTES 0.7 0.1 - 0.9 x10E3/uL    ABS. EOSINOPHILS 0.5 (H) 0.0 - 0.4 x10E3/uL    ABS. BASOPHILS 0.0 0.0 - 0.2 x10E3/uL    IMMATURE GRANULOCYTES 0 %    ABS. IMM.  GRANS. 0.0 0.0 - 0.1 x10E3/uL   LIPID PANEL   Result Value Ref Range    Cholesterol, total 165 100 - 199 mg/dL    Triglyceride 90 0 - 149 mg/dL    HDL Cholesterol 43 >39 mg/dL    VLDL, calculated 18 5 - 40 mg/dL    LDL, calculated 104 (H) 0 - 99 mg/dL   URINALYSIS W/ RFLX MICROSCOPIC   Result Value Ref Range    Specific Gravity 1.026 1.005 - 1.030    pH (UA) 6.0 5.0 - 7.5    Color Yellow Yellow    Appearance Clear Clear    Leukocyte Esterase 1+ (A) Negative    Protein Negative Negative/Trace    Glucose Negative Negative    Ketone Negative Negative    Blood 1+ (A) Negative    Bilirubin Negative Negative    Urobilinogen 0.2 0.2 - 1.0 mg/dL    Nitrites Negative Negative    Microscopic Examination See additional order    PROSTATE SPECIFIC AG (PSA)   Result Value Ref Range    Prostate Specific Ag 1.4 0.0 - 4.0 ng/mL   METABOLIC PANEL, COMPREHENSIVE   Result Value Ref Range    Glucose 93 65 - 99 mg/dL    BUN 20 8 - 27 mg/dL    Creatinine 0.92 0.76 - 1.27 mg/dL    GFR est non-AA 82 >59 mL/min/1.73    GFR est AA 95 >59 mL/min/1.73    BUN/Creatinine ratio 22 10 - 24    Sodium 142 134 - 144 mmol/L    Potassium 4.2 3.5 - 5.2 mmol/L    Chloride 104 96 - 106 mmol/L    CO2 20 18 - 29 mmol/L    Calcium 9.4 8.6 - 10.2 mg/dL    Protein, total 6.9 6.0 - 8.5 g/dL    Albumin 4.2 3.5 - 4.8 g/dL    GLOBULIN, TOTAL 2.7 1.5 - 4.5 g/dL    A-G Ratio 1.6 1.2 - 2.2    Bilirubin, total 0.9 0.0 - 1.2 mg/dL    Alk. phosphatase 78 39 - 117 IU/L    AST (SGOT) 22 0 - 40 IU/L    ALT (SGPT) 25 0 - 44 IU/L   TSH 3RD GENERATION   Result Value Ref Range    TSH 2.010 0.450 - 4.500 uIU/mL   VITAMIN B12   Result Value Ref Range    Vitamin B12 436 211 - 946 pg/mL   MICROSCOPIC EXAMINATION   Result Value Ref Range    WBC 11-30 (A) 0 - 5 /hpf    RBC 0-2 0 - 2 /hpf    Epithelial cells None seen 0 - 10 /hpf    Casts None seen None seen /lpf    Mucus Present Not Estab. Bacteria Few None seen/Few       IMAGING:  MRI Results (most recent):    Results from Hospital Encounter encounter on 05/10/17   MRI BRAIN WO CONT   Narrative EXAM:  MRI BRAIN WO CONT  INDICATION:  progressive dementia, M03.90,  TECHNIQUE: Sagittal FLAIR and T1, axial FLAIR, T2,T1 and gradient echo images as  well as coronal T2 weighted images and axial diffusion weighted images of the  head were obtained. COMPARISON:  CT head 2/22/12  FINDINGS:  Mild generalized sulcal and ventricular prominence is consistent with cerebral  volume loss and within the range of normal for age.   Multiple foci of T2 hyperintensity in the cerebral white matter with mild  increased since 2011 suggests chronic small vessel ischemic change. The left frank acute infarct demonstrated 7/6/11 now has cystic change. No evidence of hemorrhage, mass, acute infarction or abnormal extra-axial fluid  collections. Flow voids are present in the vertebral basilar and carotid artery systems. The craniocervical junction is unremarkable. The structures at the cranial base including paranasal sinuses are unremarkable. Impression IMPRESSION:   1. Findings consistent with mild chronic small vessel type ischemic white matter  disease. 2. Old left frank small lacunar infarct. 3. No acute intracranial abnormality demonstrated. Assessment:     Encounter Diagnoses     ICD-10-CM ICD-9-CM   1. Late onset Alzheimer's disease without behavioral disturbance G30.1 331.0    F02.80 294.10   2. Dementia without behavioral disturbance, unspecified dementia type F03.90 294.20   3. Remote history of stroke Z86.73 V12.54   4. Tobacco abuse Z72.0 305.1   5. Essential hypertension I10 401.9   6. Dyslipidemia E78.5 32.4   66year old AAM presenting for f/u of cognitive impairment x 2 years with slow progression. MOCA 15/30 with significantly impaired visuospatial/executive functioning, attention, delayed recall. Findings c/w moderate AD w/o behavioral disturbance. Discussed need for assistance moving forward when making executive decisions, assistance with medications (using a pill box), assistance with navigation and avoidance of traveling alone. Discussed treatment options in detail including risks/benefits and expectations. While medication is not likely to made a \"night and day\" difference in AD, it may aid modestly in improving concentration and attention. Medication titration and addition of adjunctive agents may be necessary to achieve maximum benefit. Explained that AD is a progressive disease process.   While he is not currently exhibiting destructive, paranoid or psychotic behavior, if this becomes an issue in the future we can certainly address this with appropriate medication. MRI Brain independently reviewed with patient revealing evidence of remote L pontine and L lacunar infarction, likely related to small vessel ischemia. Discussed importance of smoking cessation and continued antiplatelet and statin therapy for stroke prevention. Plan:   Increase Aricept 10mg daily  Cont. B12 supplementation 1000mcg daily  Cont. ASA, lipitor for stroke prevention  Smoking cessation   Goal LDL<70, SBP<140  Heart healthy diet and exercise  Regular scheduled cognitive and social engagement. Follow-up Disposition:  Return in about 6 months (around 1/13/2018).       Signed:  Chino Miles,   7/13/2017

## 2017-07-13 NOTE — PATIENT INSTRUCTIONS

## 2017-07-13 NOTE — MR AVS SNAPSHOT
Visit Information Date & Time Provider Department Dept. Phone Encounter #  
 7/13/2017  2:30 PM Jj Andrade, [facility] at [address] [id number] Follow-up Instructions Return in about 6 months (around 1/13/2018). Your Appointments 8/21/2017  4:45 PM  
Any with Sophia Esquivel MD  
SPORTS MED AND PRIMARY CARE - [facility] ([address]) Appt Note: 4 month f/u  
 [address] [place] [address]  
  
   
 [address] [facility] [address] Upcoming Health Maintenance Date Due DTaP/Tdap/Td series (1 - Tdap) 12/11/1964 Pneumococcal 65+ Low/Medium Risk (1 of 2 - PCV13) 12/11/2008 FOBT Q 1 YEAR AGE 50-75 6/1/2016 GLAUCOMA SCREENING Q2Y 6/1/2017 INFLUENZA AGE 9 TO ADULT 8/1/2017 MEDICARE YEARLY EXAM 4/25/2018 Allergies as of 7/13/2017  Review Complete On: 7/13/2017 By: Jj Andrade, DO No Known Allergies Current Immunizations  Reviewed on 2/22/2012 No immunizations on file. Not reviewed this visit You Were Diagnosed With   
  
 Codes Comments Late onset Alzheimer's disease without behavioral disturbance    -  Primary ICD-10-CM: G30.1, F02.80 ICD-9-CM: 331.0, 294.10 Dementia without behavioral disturbance, unspecified dementia type     ICD-10-CM: F03.90 ICD-9-CM: 294.20 Remote history of stroke     ICD-10-CM: Z86.73 
ICD-9-CM: V12.54 Tobacco abuse     ICD-10-CM: Z72.0 ICD-9-CM: 305.1 Essential hypertension     ICD-10-CM: I10 
ICD-9-CM: 401.9 Dyslipidemia     ICD-10-CM: E78.5 ICD-9-CM: 272.4 Vitals BP Pulse Resp Weight(growth percentile) SpO2 BMI  
 126/90 78 18 230 lb (104.3 kg) 98% 33.97 kg/m2 Smoking Status Current Some Day Smoker Vitals History BMI and BSA Data Body Mass Index Body Surface Area  
 33.97 kg/m 2 2.25 m 2 Preferred Pharmacy Pharmacy Name Phone [id number] [phone] Your Updated Medication List  
  
   
This list is accurate as of: 7/13/17  3:10 PM.  Always use your most recent med list.  
  
  
  
  
 aspirin 325 mg tablet Commonly known as:  ASPIRIN Take 325 mg by mouth daily. atorvastatin 80 mg tablet Commonly known as:  LIPITOR  
TAKE 1 TABLET BY MOUTH EVERY DAY  
  
 chlorhexidine 0.12 % solution Commonly known as:  PERIDEX  
  
 cyanocobalamin 500 mcg tablet Commonly known as:  VITAMIN B12 Take 2 Tabs by mouth daily. donepezil 10 mg tablet Commonly known as:  ARICEPT Take 1 Tab by mouth nightly. finasteride 5 mg tablet Commonly known as:  PROSCAR  
TAKE 1 TABLET BY MOUTH NIGHTLY  
  
 losartan 50 mg tablet Commonly known as:  COZAAR  
TAKE 1 TABLET BY MOUTH EVERY DAY  
  
 RAPAFLO 8 mg capsule Generic drug:  silodosin Prescriptions Sent to Pharmacy Refills  
 cyanocobalamin (VITAMIN B12) 500 mcg tablet 5 Sig: Take 2 Tabs by mouth daily. Class: Normal  
 Pharmacy: [facility]/pharmacy [address] #: [phone] Route: Oral  
 donepezil (ARICEPT) 10 mg tablet 5 Sig: Take 1 Tab by mouth nightly. Class: Normal  
 Pharmacy: [address] #: [phone] Route: Oral  
  
Follow-up Instructions Return in about 6 months (around 1/13/2018). Patient Instructions A Healthy Lifestyle: Care Instructions Your Care Instructions A healthy lifestyle can help you feel good, stay at a healthy weight, and have plenty of energy for both work and play. A healthy lifestyle is something you can share with your whole family.  
A healthy lifestyle also can lower your risk for serious health problems, such as high blood pressure, heart disease, and diabetes. You can follow a few steps listed below to improve your health and the health of your family. Follow-up care is a key part of your treatment and safety. Be sure to make and go to all appointments, and call your doctor if you are having problems. Its also a good idea to know your test results and keep a list of the medicines you take. How can you care for yourself at home? · Do not eat too much sugar, fat, or fast foods. You can still have dessert and treats now and then. The goal is moderation. · Start small to improve your eating habits. Pay attention to portion sizes, drink less juice and soda pop, and eat more fruits and vegetables. ¨ Eat a healthy amount of food. A 3-ounce serving of meat, for example, is about the size of a deck of cards. Fill the rest of your plate with vegetables and whole grains. ¨ Limit the amount of soda and sports drinks you have every day. Drink more water when you are thirsty. ¨ Eat at least 5 servings of fruits and vegetables every day. It may seem like a lot, but it is not hard to reach this goal. A serving or helping is 1 piece of fruit, 1 cup of vegetables, or 2 cups of leafy, raw vegetables. Have an apple or some carrot sticks as an afternoon snack instead of a candy bar. Try to have fruits and/or vegetables at every meal. 
· Make exercise part of your daily routine. You may want to start with simple activities, such as walking, bicycling, or slow swimming. Try to be active 30 to 60 minutes every day. You do not need to do all 30 to 60 minutes all at once. For example, you can exercise 3 times a day for 10 or 20 minutes. Moderate exercise is safe for most people, but it is always a good idea to talk to your doctor before starting an exercise program. 
· Keep moving. Karolyn Bi the lawn, work in the garden, or [employer]. Take the stairs instead of the elevator at work. · If you smoke, quit. People who smoke have an increased risk for heart attack, stroke, cancer, and other lung illnesses. Quitting is hard, but there are ways to boost your chance of quitting tobacco for good. ¨ Use nicotine gum, patches, or lozenges. ¨ Ask your doctor about stop-smoking programs and medicines. ¨ Keep trying. In addition to reducing your risk of diseases in the future, you will notice some benefits soon after you stop using tobacco. If you have shortness of breath or asthma symptoms, they will likely get better within a few weeks after you quit. · Limit how much alcohol you drink. Moderate amounts of alcohol (up to 2 drinks a day for men, 1 drink a day for women) are okay. But drinking too much can lead to liver problems, high blood pressure, and other health problems. Family health If you have a family, there are many things you can do together to improve your health. · Eat meals together as a family as often as possible. · Eat healthy foods. This includes fruits, vegetables, lean meats and dairy, and whole grains. · Include your family in your fitness plan. Most people think of activities such as jogging or tennis as the way to fitness, but there are many ways you and your family can be more active. Anything that makes you breathe hard and gets your heart pumping is exercise. Here are some tips: 
¨ Walk to do errands or to take your child to school or the bus. ¨ Go for a family bike ride after dinner instead of watching TV. Where can you learn more? Go to http://altaf-logan.info/. Enter L110 in the search box to learn more about \"A Healthy Lifestyle: Care Instructions. \" Current as of: July 26, 2016 Content Version: 11.3 © 3882-5859 [employer]. Care instructions adapted under license by [employer] (which disclaims liability or warranty for this information).  If you have questions about a medical condition or this instruction, always ask your healthcare professional. [address] any warranty or liability for your use of this information. Introducing [facility] & HEALTH SERVICES! Dear Julianne العراقي: Thank you for requesting a [employer] account. Our records indicate that you already have an active [employer] account. You can access your account anytime at [email or web address] Did you know that you can access your hospital and ER discharge instructions at any time in [employer]? You can also review all of your test results from your hospital stay or ER visit. Additional Information If you have questions, please visit the Frequently Asked Questions section of the [employer] website at [email or web address] Remember, [employer] is NOT to be used for urgent needs. For medical emergencies, dial 911. Now available from your iPhone and Android! Please provide this summary of care documentation to your next provider. Your primary care clinician is listed as Feli Marlow. If you have any questions after today's visit, please call [phone].

## 2017-08-21 ENCOUNTER — OFFICE VISIT (OUTPATIENT)
Dept: INTERNAL MEDICINE CLINIC | Age: 72
End: 2017-08-21

## 2017-08-21 VITALS
BODY MASS INDEX: 33.03 KG/M2 | SYSTOLIC BLOOD PRESSURE: 128 MMHG | TEMPERATURE: 97.2 F | HEART RATE: 65 BPM | DIASTOLIC BLOOD PRESSURE: 75 MMHG | HEIGHT: 69 IN | WEIGHT: 223 LBS | RESPIRATION RATE: 18 BRPM | OXYGEN SATURATION: 95 %

## 2017-08-21 DIAGNOSIS — J44.9 CHRONIC OBSTRUCTIVE PULMONARY DISEASE, UNSPECIFIED COPD TYPE (HCC): ICD-10-CM

## 2017-08-21 DIAGNOSIS — F02.80 LATE ONSET ALZHEIMER'S DISEASE WITHOUT BEHAVIORAL DISTURBANCE (HCC): ICD-10-CM

## 2017-08-21 DIAGNOSIS — I10 ESSENTIAL HYPERTENSION: ICD-10-CM

## 2017-08-21 DIAGNOSIS — E78.5 DYSLIPIDEMIA: ICD-10-CM

## 2017-08-21 DIAGNOSIS — G47.62 NOCTURNAL LEG CRAMPS: Primary | ICD-10-CM

## 2017-08-21 DIAGNOSIS — G30.1 LATE ONSET ALZHEIMER'S DISEASE WITHOUT BEHAVIORAL DISTURBANCE (HCC): ICD-10-CM

## 2017-08-21 RX ORDER — OXYBUTYNIN CHLORIDE 5 MG/1
5 TABLET ORAL 3 TIMES DAILY
COMMUNITY
End: 2019-11-08

## 2017-08-21 NOTE — PROGRESS NOTES
1. Have you been to the ER, urgent care clinic since your last visit? Hospitalized since your last visit? No    2. Have you seen or consulted any other health care providers outside of the [address] [place] [address] since your last visit? Include any pap smears or colon screening.  No

## 2017-08-21 NOTE — MR AVS SNAPSHOT
Visit Information Date & Time Provider Department Dept. Phone Encounter #  
 8/21/2017  4:45 PM Sameera Helm MD SPORTS MED AND PRIMARY CARE - [facility] [phone] [id number] Your Appointments 1/17/2018 10:00 AM  
Follow Up with DO Lindy Tinoco [facility] at [facility] AT [place] [address]) Appt Note: 6 month f/u dementia 7/13/17 [address] [place] [address]  
[phone]  
  
   
 [address] [place] [address] Upcoming Health Maintenance Date Due FOBT Q 1 YEAR AGE 50-75 6/1/2016 GLAUCOMA SCREENING Q2Y 6/1/2017 MEDICARE YEARLY EXAM 4/25/2018 Pneumococcal 65+ Low/Medium Risk (2 of 2 - PPSV23) 8/21/2018 DTaP/Tdap/Td series (2 - Td) 8/21/2027 Allergies as of 8/21/2017  Review Complete On: 8/21/2017 By: Liberty Round No Known Allergies Current Immunizations  Reviewed on 2/22/2012 No immunizations on file. Not reviewed this visit Vitals BP Pulse Temp Resp Height(growth percentile) Weight(growth percentile) 128/75 (BP 1 Location: Right arm, BP Patient Position: Sitting) 65 97.2 °F (36.2 °C) (Oral) 18 5' 9\" (1.753 m) 223 lb (101.2 kg) SpO2 BMI Smoking Status 95% 32.93 kg/m2 Current Some Day Smoker BMI and BSA Data Body Mass Index Body Surface Area  
 32.93 kg/m 2 2.22 m 2 Preferred Pharmacy Pharmacy Name Phone [id number] [phone] Your Updated Medication List  
  
   
This list is accurate as of: 8/21/17  6:17 PM.  Always use your most recent med list.  
  
  
  
  
 aspirin 325 mg tablet Commonly known as:  ASPIRIN Take 325 mg by mouth daily. atorvastatin 80 mg tablet Commonly known as:  LIPITOR  
TAKE 1 TABLET BY MOUTH EVERY DAY  
  
 chlorhexidine 0.12 % solution Commonly known as:  PERIDEX cyanocobalamin 500 mcg tablet Commonly known as:  VITAMIN B12 Take 2 Tabs by mouth daily. donepezil 10 mg tablet Commonly known as:  ARICEPT Take 1 Tab by mouth nightly. finasteride 5 mg tablet Commonly known as:  PROSCAR  
TAKE 1 TABLET BY MOUTH EVERY DAY  
  
 losartan 50 mg tablet Commonly known as:  COZAAR  
TAKE 1 TABLET BY MOUTH EVERY DAY  
  
 oxybutynin 5 mg tablet Commonly known as:  XGBRBNDL Take 5 mg by mouth three (3) times daily. RAPAFLO 8 mg capsule Generic drug:  silodosin Introducing [facility] SERVICES! Dear Juventino López: Thank you for requesting a [employer] account. Our records indicate that you already have an active [employer] account. You can access your account anytime at [email or web address] Did you know that you can access your hospital and ER discharge instructions at any time in [employer]? You can also review all of your test results from your hospital stay or ER visit. Additional Information If you have questions, please visit the Frequently Asked Questions section of the [employer] website at [email or web address] Remember, [employer] is NOT to be used for urgent needs. For medical emergencies, dial 911. Now available from your iPhone and Android! Please provide this summary of care documentation to your next provider. Your primary care clinician is listed as Feli Marlow. If you have any questions after today's visit, please call [phone].

## 2017-08-22 NOTE — PROGRESS NOTES
[address] [place] [address] and Primary Care  [place] [address]  [address]  [facility] [address]  Phone:  [phone]  Fax: [phone]       Chief Complaint   Patient presents with    Hypertension    Leg Pain     patient complains of having cramps in both legs patient states that the cramping is mostly at night time   . SUBJECTIVE:    Maurice Reese is a 66 y.o. male Comes in for return visit complaining of nocturnal leg cramps. This started about two weeks ago. He has had no new medications within the last several weeks. His memory deficit is now worse. He was able to go to [facility] with some bikers for a convention. He has a past history of primary hypertension and dyslipidemia. Current Outpatient Prescriptions   Medication Sig Dispense Refill    oxybutynin (DITROPAN) 5 mg tablet Take 5 mg by mouth three (3) times daily.  finasteride (PROSCAR) 5 mg tablet TAKE 1 TABLET BY MOUTH EVERY DAY 30 Tab 11    cyanocobalamin (VITAMIN B12) 500 mcg tablet Take 2 Tabs by mouth daily. 60 Tab 5    donepezil (ARICEPT) 10 mg tablet Take 1 Tab by mouth nightly. 30 Tab 5    atorvastatin (LIPITOR) 80 mg tablet TAKE 1 TABLET BY MOUTH EVERY DAY 30 Tab 11    losartan (COZAAR) 50 mg tablet TAKE 1 TABLET BY MOUTH EVERY DAY 30 Tab 11    aspirin (ASPIRIN) 325 mg tablet Take 325 mg by mouth daily.      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    Thyroid disease      Past Surgical History:   Procedure Laterality Date    HX COLONOSCOPY       No Known Allergies      REVIEW OF SYSTEMS:  General: negative for - chills or fever  ENT: negative for - headaches, nasal congestion or tinnitus  Respiratory: negative for - cough, hemoptysis, shortness of breath or wheezing  Cardiovascular : negative for - chest pain, edema, palpitations or shortness of breath  Gastrointestinal: negative for - abdominal pain, blood in stools, heartburn or nausea/vomiting  Genito-Urinary: no dysuria, trouble voiding, or hematuria  Musculoskeletal: negative for - gait disturbance, joint pain, joint stiffness or joint swelling  Neurological: no TIA or stroke symptoms  Hematologic: no bruises, no bleeding, no swollen glands  Integument: no lumps, mole changes, nail changes or rash  Endocrine: no malaise/lethargy or unexpected weight changes      Social History     Social History    Marital status:      Spouse name: Ronald Smith    Number of children: N/A    Years of education: 3     Occupational History    Retired      Social History Main Topics    Smoking status: Current Some Day Smoker     Packs/day: 0.25    Smokeless tobacco: Never Used    Alcohol use Yes      Comment: Socially    Drug use: No    Sexual activity: Not Asked     Other Topics Concern    None     Social History Narrative     Family History   Problem Relation Age of Onset    Diabetes Mother     Diabetes Brother        OBJECTIVE:    Visit Vitals    /75 (BP 1 Location: Right arm, BP Patient Position: Sitting)    Pulse 65    Temp 97.2 °F (36.2 °C) (Oral)    Resp 18    Ht 5' 9\" (1.753 m)    Wt 223 lb (101.2 kg)    SpO2 95%    BMI 32.93 kg/m2     CONSTITUTIONAL: well , well nourished, appears age appropriate  EYES: perrla, eom intact  ENMT:moist mucous membranes, pharynx clear  NECK: supple. Thyroid normal  RESPIRATORY: Chest: clear to ascultation and percussion   CARDIOVASCULAR: Heart: regular rate and rhythm  GASTROINTESTINAL: Abdomen: soft, bowel sounds active  HEMATOLOGIC: no pathological lymph nodes palpated  MUSCULOSKELETAL: Extremities: no edema, pulse 1+   INTEGUMENT: No unusual rashes or suspicious skin lesions noted.  Nails appear normal.  NEUROLOGIC: non-focal exam   MENTAL STATUS: alert and oriented, appropriate affect      ASSESSMENT:  1. Nocturnal leg cramps    2. Chronic obstructive pulmonary disease, unspecified COPD type ([facility]    3. Essential hypertension    4. Dyslipidemia    5. Late onset Alzheimer's disease without behavioral disturbance      PLAN:    1. As far as nocturnal leg cramps are concerned, I suggest tonic water, half a glass at night before bed. I will see how this works. 2. He has COPD, and I encourage him again to discontinue cigarette smoking. 3. Blood pressure is excellent today. No adjustments are made. 4. He will continue statin as prescribed in view of his secondary cardiovascular risk prevention, in view of the fact he has had a TIA before. 5. He appears to have probably early Alzheimer's. He is on Namenda, and I expect to add on a return visit Namenda. .  Orders Placed This Encounter    oxybutynin (DITROPAN) 5 mg tablet         Follow-up Disposition:  Return in about 4 weeks (around 9/18/2017).       Sophia Esquivel MD

## 2017-09-25 ENCOUNTER — OFFICE VISIT (OUTPATIENT)
Dept: INTERNAL MEDICINE CLINIC | Age: 72
End: 2017-09-25

## 2017-09-25 VITALS
BODY MASS INDEX: 32.73 KG/M2 | WEIGHT: 221 LBS | HEIGHT: 69 IN | OXYGEN SATURATION: 96 % | SYSTOLIC BLOOD PRESSURE: 134 MMHG | HEART RATE: 65 BPM | DIASTOLIC BLOOD PRESSURE: 61 MMHG | RESPIRATION RATE: 20 BRPM | TEMPERATURE: 98.2 F

## 2017-09-25 DIAGNOSIS — Z86.73 HISTORY OF CVA (CEREBROVASCULAR ACCIDENT): ICD-10-CM

## 2017-09-25 DIAGNOSIS — I10 ESSENTIAL HYPERTENSION: ICD-10-CM

## 2017-09-25 DIAGNOSIS — J44.9 CHRONIC OBSTRUCTIVE PULMONARY DISEASE, UNSPECIFIED COPD TYPE (HCC): ICD-10-CM

## 2017-09-25 DIAGNOSIS — E78.5 DYSLIPIDEMIA: ICD-10-CM

## 2017-09-25 DIAGNOSIS — F03.90 DEMENTIA WITHOUT BEHAVIORAL DISTURBANCE, UNSPECIFIED DEMENTIA TYPE: Primary | ICD-10-CM

## 2017-09-25 RX ORDER — DONEPEZIL HYDROCHLORIDE 5 MG/1
TABLET, FILM COATED ORAL
Refills: 2 | COMMUNITY
Start: 2017-06-25 | End: 2017-12-20 | Stop reason: CLARIF

## 2017-09-25 NOTE — PROGRESS NOTES
.1. Have you been to the ER, urgent care clinic since your last visit? Hospitalized since your last visit? No    2. Have you seen or consulted any other health care providers outside of the [address] [place] [address] since your last visit? Include any pap smears or colon screening.  No

## 2017-09-25 NOTE — MR AVS SNAPSHOT
Visit Information Date & Time Provider Department Dept. Phone Encounter #  
 9/25/2017  4:45 PM Mian Eagle MD SPORTS MED AND PRIMARY CARE - [facility] [phone] [id number] Your Appointments 1/17/2018 10:00 AM  
Follow Up with DO Rachel Frey [facility] at [facility] [address]) Appt Note: 6 month f/u dementia 7/13/17 [address] [place] [address]  
[phone]  
  
   
 [address] [place] [address] Upcoming Health Maintenance Date Due FOBT Q 1 YEAR AGE 50-75 9/25/2018* MEDICARE YEARLY EXAM 4/25/2018 Pneumococcal 65+ Low/Medium Risk (2 of 2 - PPSV23) 8/21/2018 GLAUCOMA SCREENING Q2Y 9/25/2019 DTaP/Tdap/Td series (2 - Td) 8/21/2027 *Topic was postponed. The date shown is not the original due date. Allergies as of 9/25/2017  Review Complete On: 9/25/2017 By: Charity Oliver No Known Allergies Current Immunizations  Reviewed on 2/22/2012 No immunizations on file. Not reviewed this visit Vitals BP Pulse Temp Resp Height(growth percentile) Weight(growth percentile) 134/61 (BP 1 Location: Right arm, BP Patient Position: Sitting) 65 98.2 °F (36.8 °C) (Oral) 20 5' 9\" (1.753 m) 221 lb (100.2 kg) SpO2 BMI Smoking Status 96% 32.64 kg/m2 Current Some Day Smoker BMI and BSA Data Body Mass Index Body Surface Area  
 32.64 kg/m 2 2.21 m 2 Preferred Pharmacy Pharmacy Name Phone [phone] [phone] Your Updated Medication List  
  
   
This list is accurate as of: 9/25/17  5:55 PM.  Always use your most recent med list.  
  
  
  
  
 aspirin 325 mg tablet Commonly known as:  ASPIRIN Take 325 mg by mouth daily. atorvastatin 80 mg tablet Commonly known as:  LIPITOR  
TAKE 1 TABLET BY MOUTH EVERY DAY  
  
 chlorhexidine 0.12 % solution Commonly known as:  PERIDEX  
  
 cyanocobalamin 500 mcg tablet Commonly known as:  VITAMIN B12 Take 2 Tabs by mouth daily. * donepezil 5 mg tablet Commonly known as:  ARICEPT  
TAKE 1 TABLET BY MOUTH NIGHTLY * donepezil 10 mg tablet Commonly known as:  ARICEPT Take 1 Tab by mouth nightly. finasteride 5 mg tablet Commonly known as:  PROSCAR  
TAKE 1 TABLET BY MOUTH EVERY DAY  
  
 losartan 50 mg tablet Commonly known as:  COZAAR  
TAKE 1 TABLET BY MOUTH EVERY DAY  
  
 oxybutynin 5 mg tablet Commonly known as:  CCGMNDCM Take 5 mg by mouth three (3) times daily. RAPAFLO 8 mg capsule Generic drug:  silodosin * Notice: This list has 2 medication(s) that are the same as other medications prescribed for you. Read the directions carefully, and ask your doctor or other care provider to review them with you. Introducing \[facility]\"" & HEALTH SERVICES! Dear Escobar Palma: Thank you for requesting a [employer] account. Our records indicate that you already have an active [employer] account. You can access your account anytime at [email or web address] Did you know that you can access your hospital and ER discharge instructions at any time in [employer]? You can also review all of your test results from your hospital stay or ER visit. Additional Information If you have questions, please visit the Frequently Asked Questions section of the [employer] website at [email or web address] Remember, [employer] is NOT to be used for urgent needs. For medical emergencies, dial 911. Now available from your iPhone and Android! Please provide this summary of care documentation to your next provider. Your primary care clinician is listed as Feli Marlow. If you have any questions after today's visit, please call [phone].

## 2017-09-26 NOTE — PROGRESS NOTES
[facility] and Primary Care  [place] [address]  [address] [place] [address]  Phone:  [phone]  Fax: [phone]       Chief Complaint   Patient presents with    Hypertension   . SUBJECTIVE:    Guerrero Ortiz is a 66 y.o. male Comes in for return visit stating that he is doing well. He seems to have impairment of short term memory. He is on the Aricept currently. Theoretically he would be a great candidate for Namzarec. Unfortunately he continues to smoke cigarettes. He is coughing episodically. He has a past history of primary hypertension and dyslipidemia. Current Outpatient Prescriptions   Medication Sig Dispense Refill    donepezil (ARICEPT) 5 mg tablet TAKE 1 TABLET BY MOUTH NIGHTLY  2    memantine-donepezil [facility]) 7/14/21/28 mg-10 mg C24k Take 1 Cap by mouth daily. 30 Cap 0    oxybutynin (DITROPAN) 5 mg tablet Take 5 mg by mouth three (3) times daily.  finasteride (PROSCAR) 5 mg tablet TAKE 1 TABLET BY MOUTH EVERY DAY 30 Tab 11    cyanocobalamin (VITAMIN B12) 500 mcg tablet Take 2 Tabs by mouth daily. 60 Tab 5    donepezil (ARICEPT) 10 mg tablet Take 1 Tab by mouth nightly. 30 Tab 5    atorvastatin (LIPITOR) 80 mg tablet TAKE 1 TABLET BY MOUTH EVERY DAY 30 Tab 11    losartan (COZAAR) 50 mg tablet TAKE 1 TABLET BY MOUTH EVERY DAY 30 Tab 11    aspirin (ASPIRIN) 325 mg tablet Take 325 mg by mouth daily.      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    Thyroid disease      Past Surgical History:   Procedure Laterality Date    HX COLONOSCOPY       No Known Allergies      REVIEW OF SYSTEMS:  General: negative for - chills or fever  ENT: negative for - headaches, nasal congestion or tinnitus  Respiratory: negative for - cough, hemoptysis, shortness of breath or wheezing  Cardiovascular : negative for - chest pain, edema, palpitations or shortness of breath  Gastrointestinal: negative for - abdominal pain, blood in stools, heartburn or nausea/vomiting  Genito-Urinary: no dysuria, trouble voiding, or hematuria  Musculoskeletal: negative for - gait disturbance, joint pain, joint stiffness or joint swelling  Neurological: no TIA or stroke symptoms  Hematologic: no bruises, no bleeding, no swollen glands  Integument: no lumps, mole changes, nail changes or rash  Endocrine: no malaise/lethargy or unexpected weight changes      Social History     Social History    Marital status:      Spouse name: Geovanni Skinner    Number of children: N/A    Years of education: 3     Occupational History    Retired      Social History Main Topics    Smoking status: Current Some Day Smoker     Packs/day: 0.25    Smokeless tobacco: Never Used    Alcohol use Yes      Comment: Socially    Drug use: No    Sexual activity: Not Asked     Other Topics Concern    None     Social History Narrative     Family History   Problem Relation Age of Onset    Diabetes Mother     Diabetes Brother        OBJECTIVE:    Visit Vitals    /61 (BP 1 Location: Right arm, BP Patient Position: Sitting)    Pulse 65    Temp 98.2 °F (36.8 °C) (Oral)    Resp 20    Ht 5' 9\" (1.753 m)    Wt 221 lb (100.2 kg)    SpO2 96%    BMI 32.64 kg/m2     CONSTITUTIONAL: well , well nourished, appears age appropriate  EYES: perrla, eom intact  ENMT:moist mucous membranes, pharynx clear  NECK: supple. Thyroid normal  RESPIRATORY: Chest: rene   CARDIOVASCULAR: Heart: regular rate and rhythm  GASTROINTESTINAL: Abdomen: soft, bowel sounds active  HEMATOLOGIC: no pathological lymph nodes palpated  MUSCULOSKELETAL: Extremities: no edema, pulse 1+   INTEGUMENT: No unusual rashes or suspicious skin lesions noted.  Nails appear normal.  NEUROLOGIC: non-focal exam   MENTAL STATUS: alert and oriented, appropriate affect      ASSESSMENT:  1. Dementia without behavioral disturbance, unspecified dementia type    2. Chronic obstructive pulmonary disease, unspecified COPD type ([address]    3. History of CVA (cerebrovascular accident)    4. Dyslipidemia    5. Essential hypertension        PLAN:    1. As far as the patient's dementia is concerned, I will try Namzaric. Hopefully his insurance will pay for this. He needs to continue both mental and physical stimulation. 2. He has significant COPD, and needs to discontinue cigarette smoking, as we discuss repetitively over the years. 3. He does have a history of previous CVA, and unfortunately his neurological status way beyond the cognitive defect is stable. 4. He will continue statin as prescribed in view of his secondary cardiovascular risk status. 5. Blood pressure is excellent. No adjustments are made. .  Orders Placed This Encounter    donepezil (ARICEPT) 5 mg tablet    memantine-donepezil [facility]) 7/14/21/28 mg-10 mg C24k         Follow-up Disposition:  Return in about 4 weeks (around 10/23/2017).       Khurram Verma MD

## 2017-10-05 ENCOUNTER — TELEPHONE (OUTPATIENT)
Dept: INTERNAL MEDICINE CLINIC | Age: 72
End: 2017-10-05

## 2017-12-20 ENCOUNTER — OFFICE VISIT (OUTPATIENT)
Dept: INTERNAL MEDICINE CLINIC | Age: 72
End: 2017-12-20

## 2017-12-20 VITALS
OXYGEN SATURATION: 98 % | DIASTOLIC BLOOD PRESSURE: 74 MMHG | BODY MASS INDEX: 33.8 KG/M2 | WEIGHT: 228.2 LBS | SYSTOLIC BLOOD PRESSURE: 145 MMHG | TEMPERATURE: 96.9 F | HEIGHT: 69 IN | HEART RATE: 51 BPM | RESPIRATION RATE: 18 BRPM

## 2017-12-20 DIAGNOSIS — J44.9 CHRONIC OBSTRUCTIVE PULMONARY DISEASE, UNSPECIFIED COPD TYPE (HCC): Primary | ICD-10-CM

## 2017-12-20 DIAGNOSIS — D64.9 ANEMIA, UNSPECIFIED TYPE: ICD-10-CM

## 2017-12-20 DIAGNOSIS — E78.5 DYSLIPIDEMIA: ICD-10-CM

## 2017-12-20 DIAGNOSIS — J45.20 MILD INTERMITTENT ASTHMA WITHOUT COMPLICATION: ICD-10-CM

## 2017-12-20 DIAGNOSIS — F02.80 LATE ONSET ALZHEIMER'S DISEASE WITHOUT BEHAVIORAL DISTURBANCE (HCC): ICD-10-CM

## 2017-12-20 DIAGNOSIS — G30.1 LATE ONSET ALZHEIMER'S DISEASE WITHOUT BEHAVIORAL DISTURBANCE (HCC): ICD-10-CM

## 2017-12-20 DIAGNOSIS — I10 ESSENTIAL HYPERTENSION: ICD-10-CM

## 2017-12-20 NOTE — PROGRESS NOTES
1. Have you been to the ER, urgent care clinic since your last visit? Hospitalized since your last visit? No    2. Have you seen or consulted any other health care providers outside of the [address] [place] [address] since your last visit? Include any pap smears or colon screening.  No

## 2017-12-20 NOTE — MR AVS SNAPSHOT
Visit Information Date & Time Provider Department Dept. Phone Encounter #  
 12/20/2017  2:15 PM Stephanie Torres MD SPORTS MED AND PRIMARY CARE - [facility] [phone] [id number] Your Appointments 1/17/2018 10:00 AM  
Follow Up with DO Adriano Lucio [facility] at [facility]) Appt Note: 6 month f/u dementia 7/13/17 [address] [place] [address]  
[phone]  
  
   
 [address] [place] [address] Upcoming Health Maintenance Date Due FOBT Q 1 YEAR AGE 50-75 9/25/2018* MEDICARE YEARLY EXAM 4/25/2018 Pneumococcal 65+ Low/Medium Risk (2 of 2 - PPSV23) 8/21/2018 GLAUCOMA SCREENING Q2Y 10/27/2019 DTaP/Tdap/Td series (2 - Td) 8/21/2027 *Topic was postponed. The date shown is not the original due date. Allergies as of 12/20/2017  Review Complete On: 12/20/2017 By: Leland Ratliff No Known Allergies Current Immunizations  Reviewed on 2/22/2012 No immunizations on file. Not reviewed this visit You Were Diagnosed With   
  
 Codes Comments Chronic obstructive pulmonary disease, unspecified COPD type ([facility]    -  Primary ICD-10-CM: J44.9 ICD-9-CM: 780 Mild intermittent asthma without complication     IXA-80-IV: J45.20 ICD-9-CM: 493.90 Dyslipidemia     ICD-10-CM: E78.5 ICD-9-CM: 272.4 Essential hypertension     ICD-10-CM: I10 
ICD-9-CM: 401.9 Late onset Alzheimer's disease without behavioral disturbance     ICD-10-CM: G30.1, F02.80 ICD-9-CM: 331.0, 294.10 Anemia, unspecified type     ICD-10-CM: D64.9 ICD-9-CM: 448. 9 Vitals BP Pulse Temp Resp Height(growth percentile) Weight(growth percentile) 145/74 (BP 1 Location: Right arm, BP Patient Position: Sitting) (!) 51 96.9 °F (36.1 °C) (Oral) 18 5' 9\" (1.753 m) 228 lb 3.2 oz (103.5 kg) SpO2 BMI Smoking Status 98% 33.7 kg/m2 Current Some Day Smoker Vitals History BMI and BSA Data Body Mass Index Body Surface Area 33.7 kg/m 2 2.24 m 2 Preferred Pharmacy Pharmacy Name Phone [id number] [phone] Your Updated Medication List  
  
   
This list is accurate as of: 12/20/17  3:25 PM.  Always use your most recent med list.  
  
  
  
  
 aspirin 325 mg tablet Commonly known as:  ASPIRIN Take 325 mg by mouth daily. atorvastatin 80 mg tablet Commonly known as:  LIPITOR  
TAKE 1 TABLET BY MOUTH EVERY DAY  
  
 chlorhexidine 0.12 % solution Commonly known as:  PERIDEX  
  
 cyanocobalamin 500 mcg tablet Commonly known as:  VITAMIN B12 Take 2 Tabs by mouth daily. * donepezil 5 mg tablet Commonly known as:  ARICEPT  
TAKE 1 TABLET BY MOUTH NIGHTLY * donepezil 10 mg tablet Commonly known as:  ARICEPT Take 1 Tab by mouth nightly. finasteride 5 mg tablet Commonly known as:  PROSCAR  
TAKE 1 TABLET BY MOUTH EVERY DAY  
  
 losartan 50 mg tablet Commonly known as:  COZAAR  
TAKE 1 TABLET BY MOUTH EVERY DAY  
  
 memantine-donepezil 7/14/21/28 mg-10 mg C24k Commonly known as:  [employer] [facility] Take 1 Cap by mouth daily. oxybutynin 5 mg tablet Commonly known as:  WCHDXVQC Take 5 mg by mouth three (3) times daily. RAPAFLO 8 mg capsule Generic drug:  silodosin * Notice: This list has 2 medication(s) that are the same as other medications prescribed for you. Read the directions carefully, and ask your doctor or other care provider to review them with you. We Performed the Following APOLIPOPROTEIN B [id number] CPT(R)] CBC WITH AUTOMATED DIFF [48365 CPT(R)] Introducing [facility] & HEALTH SERVICES! Dear Shawna Haney: Thank you for requesting a [employer] account. Our records indicate that you already have an active [employer] account.   You can access your account anytime at [email or web address] Did you know that you can access your hospital and ER discharge instructions at any time in [employer]? You can also review all of your test results from your hospital stay or ER visit. Additional Information If you have questions, please visit the Frequently Asked Questions section of the [employer] website at [email or web address] Remember, [employer] is NOT to be used for urgent needs. For medical emergencies, dial 911. Now available from your iPhone and Android! Please provide this summary of care documentation to your next provider. Your primary care clinician is listed as Feli Marlow. If you have any questions after today's visit, please call [phone].

## 2017-12-21 LAB
APO B SERPL-MCNC: 91 MG/DL (ref 52–135)
BASOPHILS # BLD AUTO: 0 X10E3/UL (ref 0–0.2)
BASOPHILS NFR BLD AUTO: 0 %
EOSINOPHIL # BLD AUTO: 0.9 X10E3/UL (ref 0–0.4)
EOSINOPHIL NFR BLD AUTO: 10 %
ERYTHROCYTE [DISTWIDTH] IN BLOOD BY AUTOMATED COUNT: 14.9 % (ref 12.3–15.4)
HCT VFR BLD AUTO: 39.3 % (ref 37.5–51)
HGB BLD-MCNC: 12.8 G/DL (ref 13–17.7)
IMM GRANULOCYTES # BLD: 0 X10E3/UL (ref 0–0.1)
IMM GRANULOCYTES NFR BLD: 0 %
LYMPHOCYTES # BLD AUTO: 2.2 X10E3/UL (ref 0.7–3.1)
LYMPHOCYTES NFR BLD AUTO: 26 %
MCH RBC QN AUTO: 26.9 PG (ref 26.6–33)
MCHC RBC AUTO-ENTMCNC: 32.6 G/DL (ref 31.5–35.7)
MCV RBC AUTO: 83 FL (ref 79–97)
MONOCYTES # BLD AUTO: 0.7 X10E3/UL (ref 0.1–0.9)
MONOCYTES NFR BLD AUTO: 9 %
NEUTROPHILS # BLD AUTO: 4.5 X10E3/UL (ref 1.4–7)
NEUTROPHILS NFR BLD AUTO: 55 %
PLATELET # BLD AUTO: 215 X10E3/UL (ref 150–379)
RBC # BLD AUTO: 4.76 X10E6/UL (ref 4.14–5.8)
WBC # BLD AUTO: 8.3 X10E3/UL (ref 3.4–10.8)

## 2017-12-21 NOTE — PROGRESS NOTES
[address] [facility]  [place] [address]  [address] [place] [address]  Phone:  [phone]  Fax: [phone]       Chief Complaint   Patient presents with    Pre-op Exam    Hypertension   . SUBJECTIVE:    Carmen Lacey is a 74 y.o. male   Comes in for a return visit accompanied by his wife. He plans on having eye surgery on 1/11/18. It is unclear to me as to what happened with the anticipated eye surgery one month plus ago. In any event, it did not materialize. He sees the neurologist on a regular basis for his dementia. Unfortunately, he continues to smoke cigarettes. He has existing COPD. He has a past history of primary hypertension, dyslipidemia and a previous CVA. As relates to the latter, he has had no new neurological signs or symptoms. MedDATA/gwo            Current Outpatient Prescriptions   Medication Sig Dispense Refill    oxybutynin (DITROPAN) 5 mg tablet Take 5 mg by mouth three (3) times daily.  finasteride (PROSCAR) 5 mg tablet TAKE 1 TABLET BY MOUTH EVERY DAY 30 Tab 11    cyanocobalamin (VITAMIN B12) 500 mcg tablet Take 2 Tabs by mouth daily. 60 Tab 5    donepezil (ARICEPT) 10 mg tablet Take 1 Tab by mouth nightly. 30 Tab 5    atorvastatin (LIPITOR) 80 mg tablet TAKE 1 TABLET BY MOUTH EVERY DAY 30 Tab 11    losartan (COZAAR) 50 mg tablet TAKE 1 TABLET BY MOUTH EVERY DAY 30 Tab 11    aspirin (ASPIRIN) 325 mg tablet Take 325 mg by mouth daily.      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---no sequelae    Thyroid disease      Past Surgical History:   Procedure Laterality Date    HX COLONOSCOPY       No Known Allergies      REVIEW OF SYSTEMS:  General: negative for - chills or fever  ENT: negative for - headaches, nasal congestion or tinnitus  Respiratory: negative for - cough, hemoptysis, shortness of breath or wheezing  Cardiovascular : negative for - chest pain, edema, palpitations or shortness of breath  Gastrointestinal: negative for - abdominal pain, blood in stools, heartburn or nausea/vomiting  Genito-Urinary: no dysuria, trouble voiding, or hematuria  Musculoskeletal: negative for - gait disturbance, joint pain, joint stiffness or joint swelling  Neurological: no TIA or stroke symptoms  Hematologic: no bruises, no bleeding, no swollen glands  Integument: no lumps, mole changes, nail changes or rash  Endocrine: no malaise/lethargy or unexpected weight changes      Social History     Social History    Marital status:      Spouse name: Gissel Rose    Number of children: N/A    Years of education: 3     Occupational History    Retired      Social History Main Topics    Smoking status: Current Some Day Smoker     Packs/day: 0.25    Smokeless tobacco: Never Used    Alcohol use Yes      Comment: Socially    Drug use: No    Sexual activity: Not Asked     Other Topics Concern    None     Social History Narrative     Family History   Problem Relation Age of Onset    Diabetes Mother     Diabetes Brother        OBJECTIVE:    Visit Vitals    /74 (BP 1 Location: Right arm, BP Patient Position: Sitting)    Pulse (!) 51    Temp 96.9 °F (36.1 °C) (Oral)    Resp 18    Ht 5' 9\" (1.753 m)    Wt 228 lb 3.2 oz (103.5 kg)    SpO2 98%    BMI 33.7 kg/m2     CONSTITUTIONAL: well , well nourished, appears age appropriate  EYES: perrla, eom intact  ENMT:moist mucous membranes, pharynx clear  NECK: supple.  Thyroid normal  RESPIRATORY: Chest: clear to ascultation and percussion   CARDIOVASCULAR: Heart: regular rate and rhythm  GASTROINTESTINAL: Abdomen: soft, bowel sounds active  HEMATOLOGIC: no pathological lymph nodes palpated  MUSCULOSKELETAL: Extremities: no edema, pulse 1+   INTEGUMENT: No unusual rashes or suspicious skin lesions noted. Nails appear normal.  NEUROLOGIC: non-focal exam   MENTAL STATUS: alert and oriented, appropriate affect      ASSESSMENT:  1. Chronic obstructive pulmonary disease, unspecified COPD type ([address]    2. Mild intermittent asthma without complication    3. Dyslipidemia    4. Essential hypertension    5. Late onset Alzheimer's disease without behavioral disturbance    6. Anemia, unspecified type        PLAN:    1. The patient has significant COPD with reactive airway disease. Fortunately, he is asymptomatic currently. I have encouraged him to discontinue cigarette smoking as I have done for years. 2. He will continue statin as prescribed. Efficacy and compliance will be assessed. 3. Blood pressure is entirely normal at 136/80.   4. He will continue to follow up with neurology regarding his dementia. 5. He is mildly anemia and a repeat CBC will be checked. 6. He is medically stable for the procedure and his forms are completed and will be sent to the ophthalmologist.     MedDATA/gwo     . Orders Placed This Encounter    CBC WITH AUTOMATED DIFF    APOLIPOPROTEIN B         Follow-up Disposition:  Return in about 3 months (around 3/20/2018).       Teresa Greene MD

## 2018-01-22 ENCOUNTER — OFFICE VISIT (OUTPATIENT)
Dept: NEUROLOGY | Age: 73
End: 2018-01-22

## 2018-01-22 VITALS
WEIGHT: 233 LBS | HEART RATE: 83 BPM | BODY MASS INDEX: 34.41 KG/M2 | DIASTOLIC BLOOD PRESSURE: 84 MMHG | SYSTOLIC BLOOD PRESSURE: 130 MMHG | OXYGEN SATURATION: 98 % | RESPIRATION RATE: 18 BRPM

## 2018-01-22 DIAGNOSIS — G30.1 LATE ONSET ALZHEIMER'S DISEASE WITHOUT BEHAVIORAL DISTURBANCE (HCC): Primary | ICD-10-CM

## 2018-01-22 DIAGNOSIS — F02.80 LATE ONSET ALZHEIMER'S DISEASE WITHOUT BEHAVIORAL DISTURBANCE (HCC): Primary | ICD-10-CM

## 2018-01-22 DIAGNOSIS — Z86.73 HISTORY OF CVA (CEREBROVASCULAR ACCIDENT): ICD-10-CM

## 2018-01-22 DIAGNOSIS — I10 ESSENTIAL HYPERTENSION: ICD-10-CM

## 2018-01-22 DIAGNOSIS — E78.5 DYSLIPIDEMIA: ICD-10-CM

## 2018-01-22 NOTE — PATIENT INSTRUCTIONS

## 2018-01-22 NOTE — MR AVS SNAPSHOT
[facility] [address] [place] [address] 
[phone] Patient: Adilene Cruz MRN: [id number] :1945 Visit Information Date & Time Provider Department Dept. Phone Encounter #  
 2018  3:40 PM Milady Van, [facility] at [address] [id number] Follow-up Instructions Return in about 6 months (around 2018). Your Appointments 2018  2:15 PM  
Any with Lindy Jose MD  
[facility] - Saskia Pierre ([facility] Appt Note: 4 month f/u htn [address], [place] [address] [facility] 98  
  
   
 [address], [place] [address] [place] [address] Upcoming Health Maintenance Date Due FOBT Q 1 YEAR AGE 50-75 2018* MEDICARE YEARLY EXAM 2018 Pneumococcal 65+ Low/Medium Risk (2 of 2 - PPSV23) 2018 GLAUCOMA SCREENING Q2Y 10/27/2019 DTaP/Tdap/Td series (2 - Td) 2027 *Topic was postponed. The date shown is not the original due date. Allergies as of 2018  Review Complete On: 2018 By: Milady Van DO No Known Allergies Current Immunizations  Reviewed on 2012 No immunizations on file. Not reviewed this visit You Were Diagnosed With   
  
 Codes Comments Late onset Alzheimer's disease without behavioral disturbance    -  Primary ICD-10-CM: G30.1, F02.80 ICD-9-CM: 331.0, 294.10 History of CVA (cerebrovascular accident)     ICD-10-CM: Z86.73 
ICD-9-CM: V12.54 Dyslipidemia     ICD-10-CM: E78.5 ICD-9-CM: 272.4 Essential hypertension     ICD-10-CM: I10 
ICD-9-CM: 401.9 Vitals BP Pulse Resp Weight(growth percentile) SpO2 BMI  
 130/84 83 18 233 lb (105.7 kg) 98% 34.41 kg/m2 Smoking Status Current Some Day Smoker BMI and BSA Data Body Mass Index Body Surface Area  34.41 kg/m 2 2.27 m 2  
  
  
 Preferred Pharmacy Pharmacy Name Phone [id number] [id number] [facility] [phone] Your Updated Medication List  
  
   
This list is accurate as of: 1/22/18  3:59 PM.  Always use your most recent med list.  
  
  
  
  
 aspirin 325 mg tablet Commonly known as:  ASPIRIN Take 325 mg by mouth daily. atorvastatin 80 mg tablet Commonly known as:  LIPITOR  
TAKE 1 TABLET BY MOUTH EVERY DAY  
  
 cyanocobalamin 500 mcg tablet Commonly known as:  VITAMIN B12 Take 2 Tabs by mouth daily. donepezil 10 mg tablet Commonly known as:  ARICEPT Take 1 Tab by mouth nightly. finasteride 5 mg tablet Commonly known as:  PROSCAR  
TAKE 1 TABLET BY MOUTH EVERY DAY  
  
 losartan 50 mg tablet Commonly known as:  COZAAR  
TAKE 1 TABLET BY MOUTH EVERY DAY  
  
 oxybutynin 5 mg tablet Commonly known as:  TSOKVQES Take 5 mg by mouth three (3) times daily. Follow-up Instructions Return in about 6 months (around 7/22/2018). Patient Instructions A Healthy Lifestyle: Care Instructions Your Care Instructions A healthy lifestyle can help you feel good, stay at a healthy weight, and have plenty of energy for both work and play. A healthy lifestyle is something you can share with your whole family. A healthy lifestyle also can lower your risk for serious health problems, such as high blood pressure, heart disease, and diabetes. You can follow a few steps listed below to improve your health and the health of your family. Follow-up care is a key part of your treatment and safety. Be sure to make and go to all appointments, and call your doctor if you are having problems. It's also a good idea to know your test results and keep a list of the medicines you take. How can you care for yourself at home? · Do not eat too much sugar, fat, or fast foods.  You can still have dessert and treats now and then. The goal is moderation. · Start small to improve your eating habits. Pay attention to portion sizes, drink less juice and soda pop, and eat more fruits and vegetables. ¨ Eat a healthy amount of food. A 3-ounce serving of meat, for example, is about the size of a deck of cards. Fill the rest of your plate with vegetables and whole grains. ¨ Limit the amount of soda and sports drinks you have every day. Drink more water when you are thirsty. ¨ Eat at least 5 servings of fruits and vegetables every day. It may seem like a lot, but it is not hard to reach this goal. A serving or helping is 1 piece of fruit, 1 cup of vegetables, or 2 cups of leafy, raw vegetables. Have an apple or some carrot sticks as an afternoon snack instead of a candy bar. Try to have fruits and/or vegetables at every meal. 
· Make exercise part of your daily routine. You may want to start with simple activities, such as walking, bicycling, or slow swimming. Try to be active 30 to 60 minutes every day. You do not need to do all 30 to 60 minutes all at once. For example, you can exercise 3 times a day for 10 or 20 minutes. Moderate exercise is safe for most people, but it is always a good idea to talk to your doctor before starting an exercise program. 
· Keep moving. Lucita Nam the lawn, work in the garden, or [employer]. Take the stairs instead of the elevator at work. · If you smoke, quit. People who smoke have an increased risk for heart attack, stroke, cancer, and other lung illnesses. Quitting is hard, but there are ways to boost your chance of quitting tobacco for good. ¨ Use nicotine gum, patches, or lozenges. ¨ Ask your doctor about stop-smoking programs and medicines. ¨ Keep trying.  
In addition to reducing your risk of diseases in the future, you will notice some benefits soon after you stop using tobacco. If you have shortness of breath or asthma symptoms, they will likely get better within a few weeks after you quit. · Limit how much alcohol you drink. Moderate amounts of alcohol (up to 2 drinks a day for men, 1 drink a day for women) are okay. But drinking too much can lead to liver problems, high blood pressure, and other health problems. Family health If you have a family, there are many things you can do together to improve your health. · Eat meals together as a family as often as possible. · Eat healthy foods. This includes fruits, vegetables, lean meats and dairy, and whole grains. · Include your family in your fitness plan. Most people think of activities such as jogging or tennis as the way to fitness, but there are many ways you and your family can be more active. Anything that makes you breathe hard and gets your heart pumping is exercise. Here are some tips: 
¨ Walk to do errands or to take your child to school or the bus. ¨ Go for a family bike ride after dinner instead of watching TV. Where can you learn more? Go to http://altaf-logan.info/. Enter Z187 in the search box to learn more about \"A Healthy Lifestyle: Care Instructions. \" Current as of: May 12, 2017 Content Version: 11.4 © 8409-2094 [employer]. Care instructions adapted under license by [employer] (which disclaims liability or warranty for this information). If you have questions about a medical condition or this instruction, always ask your healthcare professional. [place] [address] any warranty or liability for your use of this information. Introducing [facility]! Dear Ana Love: Thank you for requesting a [employer] account. Our records indicate that you already have an active [employer] account. You can access your account anytime at [email or web address]. Owned it/[employer] Did you know that you can access your hospital and ER discharge instructions at any time in [employer]?   You can also review all of your test results from your hospital stay or ER visit. Additional Information If you have questions, please visit the Frequently Asked Questions section of the [employer] website at [email or web address]. [employer]. [employer]/mychart/. Remember, [employer] is NOT to be used for urgent needs. For medical emergencies, dial 911. Now available from your iPhone and Android! Please provide this summary of care documentation to your next provider. Your primary care clinician is listed as Feli Marlow. If you have any questions after today's visit, please call [phone].

## 2018-01-22 NOTE — PROGRESS NOTES
Neurology Clinic Follow up Note    Patient ID:  Ray Morrissey  [id number]  1 y.o.  1945      Mr. Jose Monique is here for follow up today of dementia         Last Appointment With Me:  7/13/2017       Interval History:   Pt returns for f/u of memory impairment. He feels memory is stable. Recently underwent cataract surgery. Wife reports ongoing intermittent agitation. Tolerating Aricept. No reported side effects. He reports he is still having some difficulty with recall. He is taking B12 supplementation daily. Ability to function:  Driving: yes, rides motorcycles, some difficulty with navigation in the past.  He feels this has improved. Finances: Wife has taken over paying bills for the most part  Manages own medication: assisted by his wife. Resides: at home with his wife  Fhx dementia: +father      PMHx/ PSHx/ FHx/ SHx:  Reviewed and unchanged previous visit. Past Medical History:   Diagnosis Date    [facility] [address]     Hypercholesterolemia     Hypertension     Sarcoidosis, lung ([facility] [address]     Stroke ([facility] [address]     CVA in distribution RMA---no sequelae    Thyroid disease          ROS:  Comprehensive review of systems negative except for as noted above. Objective:       Meds:  Current Outpatient Prescriptions   Medication Sig Dispense Refill    oxybutynin (DITROPAN) 5 mg tablet Take 5 mg by mouth three (3) times daily.  finasteride (PROSCAR) 5 mg tablet TAKE 1 TABLET BY MOUTH EVERY DAY 30 Tab 11    cyanocobalamin (VITAMIN B12) 500 mcg tablet Take 2 Tabs by mouth daily. 60 Tab 5    donepezil (ARICEPT) 10 mg tablet Take 1 Tab by mouth nightly. 30 Tab 5    atorvastatin (LIPITOR) 80 mg tablet TAKE 1 TABLET BY MOUTH EVERY DAY 30 Tab 11    losartan (COZAAR) 50 mg tablet TAKE 1 TABLET BY MOUTH EVERY DAY 30 Tab 11    aspirin (ASPIRIN) 325 mg tablet Take 325 mg by mouth daily.          Exam:  Visit Vitals    /84    Pulse 83    Resp 18    Wt 105.7 kg (233 lb)    SpO2 98%    BMI 34.41 kg/m2     NEUROLOGICAL EXAM:  General: Awake, alert, speech fluent  CN: PERRL, EOMI without nystagmus, VFF to confrontation, facial sensation and strength are normal and symmetric, hearing is intact to finger rub bilaterally, palate and tongue movements are intact and symmetric. Motor: Normal tone, bulk and strength bilaterally. Reflexes: 1/4 and symmetric, plantar stimulation is flexor. Coordination: FNF, GI, HTS intact. Sensation: LT intact throughout. Gait: Normal-based and steady. LABS  Results for orders placed or performed in visit on 12/20/17   CBC WITH AUTOMATED DIFF   Result Value Ref Range    WBC 8.3 3.4 - 10.8 x10E3/uL    RBC 4.76 4.14 - 5.80 x10E6/uL    HGB 12.8 (L) 13.0 - 17.7 g/dL    HCT 39.3 37.5 - 51.0 %    MCV 83 79 - 97 fL    MCH 26.9 26.6 - 33.0 pg    MCHC 32.6 31.5 - 35.7 g/dL    RDW 14.9 12.3 - 15.4 %    PLATELET 448 236 - 687 x10E3/uL    NEUTROPHILS 55 Not Estab. %    Lymphocytes 26 Not Estab. %    MONOCYTES 9 Not Estab. %    EOSINOPHILS 10 Not Estab. %    BASOPHILS 0 Not Estab. %    ABS. NEUTROPHILS 4.5 1.4 - 7.0 x10E3/uL    Abs Lymphocytes 2.2 0.7 - 3.1 x10E3/uL    ABS. MONOCYTES 0.7 0.1 - 0.9 x10E3/uL    ABS. EOSINOPHILS 0.9 (H) 0.0 - 0.4 x10E3/uL    ABS. BASOPHILS 0.0 0.0 - 0.2 x10E3/uL    IMMATURE GRANULOCYTES 0 Not Estab. %    ABS. IMM. GRANS. 0.0 0.0 - 0.1 x10E3/uL   APOLIPOPROTEIN B   Result Value Ref Range    Apolipoprotein B 91 52 - 135 mg/dL       IMAGING:  MRI Results (most recent):    Results from Hospital Encounter encounter on 05/10/17   MRI BRAIN WO CONT   Narrative EXAM:  MRI BRAIN WO CONT  INDICATION:  progressive dementia, M03.90,  TECHNIQUE: Sagittal FLAIR and T1, axial FLAIR, T2,T1 and gradient echo images as  well as coronal T2 weighted images and axial diffusion weighted images of the  head were obtained.   COMPARISON:  CT head 2/22/12  FINDINGS:  Mild generalized sulcal and ventricular prominence is consistent with cerebral  volume loss and within the range of normal for age. Multiple foci of T2 hyperintensity in the cerebral white matter with mild  increased since 2011 suggests chronic small vessel ischemic change. The left frank acute infarct demonstrated 7/6/11 now has cystic change. No evidence of hemorrhage, mass, acute infarction or abnormal extra-axial fluid  collections. Flow voids are present in the vertebral basilar and carotid artery systems. The craniocervical junction is unremarkable. The structures at the cranial base including paranasal sinuses are unremarkable. Impression IMPRESSION:   1. Findings consistent with mild chronic small vessel type ischemic white matter  disease. 2. Old left frank small lacunar infarct. 3. No acute intracranial abnormality demonstrated. Assessment:     Encounter Diagnoses     ICD-10-CM ICD-9-CM   1. Late onset Alzheimer's disease without behavioral disturbance G30.1 331.0    F02.80 294.10   2. History of CVA (cerebrovascular accident) Z86.73 V12.54   3. Dyslipidemia E78.5 272.4   4. Essential hypertension I10 36.9      74year old AAM presenting for f/u of cognitive impairment with slow progression. MOCA 15/30 with significantly impaired visuospatial/executive functioning, attention, delayed recall. Findings c/w moderate AD w/o behavioral disturbance. Discussed need for assistance moving forward when making executive decisions, assistance with medications (using a pill box), assistance with navigation and avoidance of traveling alone. Discussed treatment options in detail including risks/benefits and expectations. While medication is not likely to made a \"night and day\" difference in AD, it may aid modestly in improving concentration and attention. Medication titration and addition of adjunctive agents may be necessary to achieve maximum benefit. Explained that AD is a progressive disease process.   While he is not currently exhibiting destructive, paranoid or psychotic behavior, if this becomes an issue in the future we can certainly address this with appropriate medication. MRI Brain independently reviewed with patient revealing evidence of remote L pontine and L lacunar infarction, likely related to small vessel ischemia. Discussed importance of smoking cessation and continued antiplatelet and statin therapy for stroke prevention. Plan:   D/C Aricept 10mg daily, start Namzaric titration  Cont. B12 supplementation 1000mcg daily  Cont. ASA, lipitor for stroke prevention  Goal LDL<70, SBP<140  Heart healthy diet and exercise  Regular scheduled cognitive and social engagement. Follow-up Disposition:  Return in about 6 months (around 7/22/2018).       Signed:  Deo Duckworth DO  1/22/2018

## 2018-02-26 ENCOUNTER — TELEPHONE (OUTPATIENT)
Dept: NEUROLOGY | Age: 73
End: 2018-02-26

## 2018-02-26 NOTE — TELEPHONE ENCOUNTER
----- Message from Catalino Llanes sent at 2/26/2018 11:08 AM EST -----  Regarding: Dr. Sherrie Lang telephone  Hussein Mao, wife, is requesting a callback from the nurse. The call would be about the trial medication that they pt was put on. Best contact number is [phone].

## 2018-02-26 NOTE — TELEPHONE ENCOUNTER
Spoke with wife, she states patient finished Namzaric titration dose pack on Thursday and wanted to know if an Rx could be sent in.

## 2018-03-28 RX ORDER — LOSARTAN POTASSIUM 50 MG/1
TABLET ORAL
Qty: 90 TAB | Refills: 3 | Status: SHIPPED | OUTPATIENT
Start: 2018-03-28 | End: 2019-08-29 | Stop reason: SDUPTHER

## 2018-04-25 ENCOUNTER — OFFICE VISIT (OUTPATIENT)
Dept: INTERNAL MEDICINE CLINIC | Age: 73
End: 2018-04-25

## 2018-04-25 VITALS
HEIGHT: 69 IN | BODY MASS INDEX: 34.66 KG/M2 | WEIGHT: 234 LBS | RESPIRATION RATE: 16 BRPM | SYSTOLIC BLOOD PRESSURE: 145 MMHG | DIASTOLIC BLOOD PRESSURE: 58 MMHG | OXYGEN SATURATION: 94 % | HEART RATE: 65 BPM | TEMPERATURE: 97.5 F

## 2018-04-25 DIAGNOSIS — Z00.00 WELLNESS EXAMINATION: ICD-10-CM

## 2018-04-25 DIAGNOSIS — E78.5 DYSLIPIDEMIA: ICD-10-CM

## 2018-04-25 DIAGNOSIS — Z13.31 SCREENING FOR DEPRESSION: ICD-10-CM

## 2018-04-25 DIAGNOSIS — J44.9 CHRONIC OBSTRUCTIVE PULMONARY DISEASE, UNSPECIFIED COPD TYPE (HCC): Primary | ICD-10-CM

## 2018-04-25 DIAGNOSIS — Z86.73 HISTORY OF CVA (CEREBROVASCULAR ACCIDENT): ICD-10-CM

## 2018-04-25 DIAGNOSIS — I10 ESSENTIAL HYPERTENSION: ICD-10-CM

## 2018-04-25 DIAGNOSIS — J45.20 MILD INTERMITTENT ASTHMA WITHOUT COMPLICATION: ICD-10-CM

## 2018-04-25 DIAGNOSIS — Z13.39 SCREENING FOR ALCOHOLISM: ICD-10-CM

## 2018-04-25 DIAGNOSIS — N40.0 PROSTATISM: ICD-10-CM

## 2018-04-25 NOTE — MR AVS SNAPSHOT
[place] , [facility] [address] 
[phone] Patient: Abhishek Jaime MRN: [id number] :1945 Visit Information Date & Time Provider Department Dept. Phone Encounter #  
 2018  2:15 PM Bertha Yanez MD [facility] [phone] [id number] Your Appointments 2018 10:00 AM  
Follow Up with DO [facility] Appt Note: 6 month f/u memory loss [address] [place] [address]  
[phone]  
  
   
 [address] [place] [address]  
  
    
 10/31/2018  3:00 PM  
Any with Bertha Yanez MD  
[facility] - [facility] ([facility]) Appt Note: 6 month follow up [address], [place] [address] [facility]  
  
   
 [address], [place] [address] Upcoming Health Maintenance Date Due FOBT Q 1 YEAR AGE 50-75 2018* Pneumococcal 65+ Low/Medium Risk (2 of 2 - PPSV23) 2018 MEDICARE YEARLY EXAM 2019 GLAUCOMA SCREENING Q2Y 10/27/2019 DTaP/Tdap/Td series (2 - Td) 2027 *Topic was postponed. The date shown is not the original due date. Allergies as of 2018  Review Complete On: 2018 By: Miracle Costa No Known Allergies Current Immunizations  Reviewed on 2012 No immunizations on file. Not reviewed this visit You Were Diagnosed With   
  
 Codes Comments Chronic obstructive pulmonary disease, unspecified COPD type ([facility] [address] [facility]    -  Primary ICD-10-CM: J44.9 ICD-9-CM: 350 Mild intermittent asthma without complication     EWR-29-ZV: J45.20 ICD-9-CM: 493.90 History of CVA (cerebrovascular accident)     ICD-10-CM: Z86.73 
ICD-9-CM: V12.54 Dyslipidemia     ICD-10-CM: E78.5 ICD-9-CM: 272.4  Essential hypertension     ICD-10-CM: I10 
 ICD-9-CM: 401.9 Prostatism     ICD-10-CM: N40.0 ICD-9-CM: 600.90 Vitals BP Pulse Temp Resp Height(growth percentile) Weight(growth percentile) 145/58 65 97.5 °F (36.4 °C) (Oral) 16 5' 9\" (1.753 m) 234 lb (106.1 kg) SpO2 BMI Smoking Status 94% 34.56 kg/m2 Current Some Day Smoker BMI and BSA Data Body Mass Index Body Surface Area 34.56 kg/m 2 2.27 m 2 Preferred Pharmacy Pharmacy Name Phone [phone] [phone] Your Updated Medication List  
  
   
This list is accurate as of 4/25/18  4:34 PM.  Always use your most recent med list.  
  
  
  
  
 aspirin 325 mg tablet Commonly known as:  ASPIRIN Take 325 mg by mouth daily. atorvastatin 80 mg tablet Commonly known as:  LIPITOR  
TAKE 1 TABLET BY MOUTH EVERY DAY  
  
 cyanocobalamin 500 mcg tablet Commonly known as:  VITAMIN B12 Take 2 Tabs by mouth daily. donepezil 10 mg tablet Commonly known as:  ARICEPT Take 1 Tab by mouth nightly. finasteride 5 mg tablet Commonly known as:  PROSCAR  
TAKE 1 TABLET BY MOUTH EVERY DAY  
  
 losartan 50 mg tablet Commonly known as:  COZAAR  
TAKE 1 TABLET BY MOUTH EVERY DAY  
  
 memantine-donepezil 28-10 mg Cspx Commonly known as:  [facility] Take 28 mg by mouth daily. oxybutynin 5 mg tablet Commonly known as:  XVHSLQSI Take 5 mg by mouth three (3) times daily. We Performed the Following APOLIPOPROTEIN B [id number] CPT(R)] CBC WITH AUTOMATED DIFF [58939 CPT(R)] COLLECTION VENOUS BLOOD,VENIPUNCTURE [id number] CPT(R)] LIPID PANEL [70009 CPT(R)] METABOLIC PANEL, COMPREHENSIVE [92171 CPT(R)] PSA, DIAGNOSTIC (PROSTATE SPECIFIC AG) [id number] CPT(R)] URINALYSIS W/ RFLX MICROSCOPIC [14169 CPT(R)] Introducing [facility]! Dear Sorin Handy: Thank you for requesting a [employer] account.   Our records indicate that you already have an active [employer] account. You can access your account anytime at [email or web address] Did you know that you can access your hospital and ER discharge instructions at any time in [employer]? You can also review all of your test results from your hospital stay or ER visit. Additional Information If you have questions, please visit the Frequently Asked Questions section of the [employer] website at [email or web address] Remember, [employer] is NOT to be used for urgent needs. For medical emergencies, dial 911. Now available from your iPhone and Android! Please provide this summary of care documentation to your next provider. Your primary care clinician is listed as Feli Marlow. If you have any questions after today's visit, please call [phone].

## 2018-04-25 NOTE — PROGRESS NOTES
[address] [facility]  [facility]  [address] [facility] [address]  Phone:  [phone]  Fax: [phone]       Chief Complaint   Patient presents with   [facility] Wellness Visit   . SUBJECTIVE:    Yanci Alexander is a 74 y.o. male comes in for return visit stating that he has done fairly well. He is not taking his medication from the urologist because he states it is not working. He has not had any suggestion of a CNS event. He continues to smoke cigarettes and has existing COPD. He has a past history of primary hypertension and dyslipidemia. Finally, he remains obese and is not gaining any meaningful weight for now. His dementia is getting progressively worse although he is reasonably functional.               Current Outpatient Prescriptions   Medication Sig Dispense Refill    losartan (COZAAR) 50 mg tablet TAKE 1 TABLET BY MOUTH EVERY DAY 90 Tab 3    memantine-donepezil (NAMZARIC) 28-10 mg CSpX Take 28 mg by mouth daily. 90 Cap 0    oxybutynin (DITROPAN) 5 mg tablet Take 5 mg by mouth three (3) times daily.  finasteride (PROSCAR) 5 mg tablet TAKE 1 TABLET BY MOUTH EVERY DAY 30 Tab 11    cyanocobalamin (VITAMIN B12) 500 mcg tablet Take 2 Tabs by mouth daily. 60 Tab 5    donepezil (ARICEPT) 10 mg tablet Take 1 Tab by mouth nightly. 30 Tab 5    atorvastatin (LIPITOR) 80 mg tablet TAKE 1 TABLET BY MOUTH EVERY DAY 30 Tab 11    aspirin (ASPIRIN) 325 mg tablet Take 325 mg by mouth daily.        Past Medical History:   Diagnosis Date    Chronic obstructive pulmonary disease ([address]     Hypercholesterolemia     Hypertension     Sarcoidosis, lung ([facility]     Stroke ([facility] [address] [facility]     CVA in distribution RMA---no sequelae    Thyroid disease      Past Surgical History:   Procedure Laterality Date    HX COLONOSCOPY       No Known Allergies      REVIEW OF SYSTEMS:  General: negative for - chills or fever  ENT: negative for - headaches, nasal congestion or tinnitus  Respiratory: negative for - cough, hemoptysis, shortness of breath or wheezing  Cardiovascular : negative for - chest pain, edema, palpitations or shortness of breath  Gastrointestinal: negative for - abdominal pain, blood in stools, heartburn or nausea/vomiting  Genito-Urinary: no dysuria, trouble voiding, or hematuria  Musculoskeletal: negative for - gait disturbance, joint pain, joint stiffness or joint swelling  Neurological: no TIA or stroke symptoms  Hematologic: no bruises, no bleeding, no swollen glands  Integument: no lumps, mole changes, nail changes or rash  Endocrine: no malaise/lethargy or unexpected weight changes      Social History     Social History    Marital status:      Spouse name: Susy Arias    Number of children: N/A    Years of education: 3     Occupational History    Retired      Social History Main Topics    Smoking status: Current Some Day Smoker     Packs/day: 0.25    Smokeless tobacco: Never Used    Alcohol use Yes      Comment: Socially    Drug use: No    Sexual activity: Not Asked     Other Topics Concern    None     Social History Narrative     Family History   Problem Relation Age of Onset    Diabetes Mother     Diabetes Brother        OBJECTIVE:    Visit Vitals    /58    Pulse 65    Temp 97.5 °F (36.4 °C) (Oral)    Resp 16    Ht 5' 9\" (1.753 m)    Wt 234 lb (106.1 kg)    SpO2 94%    BMI 34.56 kg/m2     CONSTITUTIONAL: well , well nourished, appears age appropriate  EYES: perrla, eom intact  ENMT:moist mucous membranes, pharynx clear  NECK: supple.  Thyroid normal  RESPIRATORY: Chest: clear to ascultation and percussion, decreased breath sounds bilaterally, increase AP diameter  CARDIOVASCULAR: Heart: regular rate and rhythm  GASTROINTESTINAL: Abdomen: soft, bowel sounds active  HEMATOLOGIC: no pathological lymph nodes palpated  MUSCULOSKELETAL: Extremities: no edema, pulse 1+   INTEGUMENT: No unusual rashes or suspicious skin lesions noted. Nails appear normal.  NEUROLOGIC: non-focal exam   MENTAL STATUS: alert and oriented, appropriate affect      ASSESSMENT:  1. Chronic obstructive pulmonary disease, unspecified COPD type ([facility]    2. Mild intermittent asthma without complication    3. History of CVA (cerebrovascular accident)    4. Dyslipidemia    5. Essential hypertension    6. Prostatism    7. Screening for alcoholism    8. Screening for depression    9. Wellness examination      Diagnoses and all orders for this visit:    1. Chronic obstructive pulmonary disease, unspecified COPD type ([facility]  Assessment & Plan:  Uncontrolled, based on history, physical exam and review of pertinent labs, studies and medications; meds reconciled; lifestyle modifications recommended. Key COPD Medications     Patient is on no COPD/Asthma meds. Lab Results   Component Value Date/Time    WBC 8.3 12/20/2017 03:24 PM    HGB 12.8 12/20/2017 03:24 PM    HCT 39.3 12/20/2017 03:24 PM    PLATELET 000 74/40/1048 03:24 PM         2. Mild intermittent asthma without complication    3. History of CVA (cerebrovascular accident)    4. Dyslipidemia  -     APOLIPOPROTEIN B  -     LIPID PANEL    5. Essential hypertension  -     CBC WITH AUTOMATED DIFF  -     COLLECTION VENOUS BLOOD,VENIPUNCTURE  -     METABOLIC PANEL, COMPREHENSIVE  -     URINALYSIS W/ RFLX MICROSCOPIC    6. Prostatism  -     PROSTATE SPECIFIC AG    7. Screening for alcoholism  -     Annual  Alcohol Screen 15 min ()    8. Screening for depression  -     Depression Screen Annual    9. Wellness examination        PLAN:    1. He does have existing COPD and I again encouraged him to discontinue cigarette smoking which he is not going to do. He is not wheezing at all today. 2. He has no sequelae from his previous CVA and no suggestion of any new events. 3. He will continue statin as prescribed in view of his secondary cardiovascular risk status.     4. Blood pressure is excellent today, no adjustments are made. 5. He will follow-up with urology. He has prostatism. .  Orders Placed This Encounter    Depression Screen Annual    APOLIPOPROTEIN B    CBC WITH AUTOMATED DIFF    LIPID PANEL    METABOLIC PANEL, COMPREHENSIVE    PROSTATE SPECIFIC AG    URINALYSIS W/ RFLX MICROSCOPIC         Follow-up Disposition:  Return in about 4 months (around 8/25/2018). Abena Kong MD      This is the Subsequent Medicare Annual Wellness Exam, performed 12 months or more after the Initial AWV or the last Subsequent AWV    I have reviewed the patient's medical history in detail and updated the computerized patient record. History     Past Medical History:   Diagnosis Date    Chronic obstructive pulmonary disease ([facility]     Hypercholesterolemia     Hypertension     Sarcoidosis, lung ([facility]     Stroke ([facility] [address]     CVA in distribution RMA---no sequelae    Thyroid disease       Past Surgical History:   Procedure Laterality Date    HX COLONOSCOPY       Current Outpatient Prescriptions   Medication Sig Dispense Refill    losartan (COZAAR) 50 mg tablet TAKE 1 TABLET BY MOUTH EVERY DAY 90 Tab 3    memantine-donepezil (NAMZARIC) 28-10 mg CSpX Take 28 mg by mouth daily. 90 Cap 0    oxybutynin (DITROPAN) 5 mg tablet Take 5 mg by mouth three (3) times daily.  finasteride (PROSCAR) 5 mg tablet TAKE 1 TABLET BY MOUTH EVERY DAY 30 Tab 11    cyanocobalamin (VITAMIN B12) 500 mcg tablet Take 2 Tabs by mouth daily. 60 Tab 5    donepezil (ARICEPT) 10 mg tablet Take 1 Tab by mouth nightly. 30 Tab 5    atorvastatin (LIPITOR) 80 mg tablet TAKE 1 TABLET BY MOUTH EVERY DAY 30 Tab 11    aspirin (ASPIRIN) 325 mg tablet Take 325 mg by mouth daily.        No Known Allergies  Family History   Problem Relation Age of Onset    Diabetes Mother     Diabetes Brother      Social History   Substance Use Topics    Smoking status: Current Some Day Smoker     Packs/day: 0.25    Smokeless tobacco: Never Used   [facility] Alcohol use Yes      Comment: Socially     Patient Active Problem List   Diagnosis Code    COPD (chronic obstructive pulmonary disease) ([facility] J44.9    Asthma J45.909    History of CVA (cerebrovascular accident) Z80.78    Dyslipidemia E78.5    Hypertension I10    Prostatism N40.0    Benign prostatic hyperplasia with lower urinary tract symptoms N40.1    Late onset Alzheimer's disease without behavioral disturbance G30.1, F02.80    Dementia without behavioral disturbance F03.90       Depression Risk Factor Screening:     PHQ over the last two weeks 4/25/2018   PHQ Not Done -   Little interest or pleasure in doing things Not at all   Feeling down, depressed or hopeless Not at all   Total Score PHQ 2 0     Alcohol Risk Factor Screening: You do not drink alcohol or very rarely. Functional Ability and Level of Safety:   Hearing Loss  Hearing is good. Activities of Daily Living  The home contains: no safety equipment. Patient does total self care    Fall Risk  Fall Risk Assessment, last 12 mths 4/25/2018   Able to walk? Yes   Fall in past 12 months? No       Abuse Screen  Patient is not abused    Cognitive Screening   Evaluation of Cognitive Function:  Has your family/caregiver stated any concerns about your memory: yes  Abnormal    Patient Care Team   Patient Care Team:  Khurram Verma MD as PCP - General (Internal Medicine)    Assessment/Plan   Education and counseling provided:  Are appropriate based on today's review and evaluation    Diagnoses and all orders for this visit:    1. Chronic obstructive pulmonary disease, unspecified COPD type ([facility]  Assessment & Plan:  Uncontrolled, based on history, physical exam and review of pertinent labs, studies and medications; meds reconciled; lifestyle modifications recommended. Key COPD Medications     Patient is on no COPD/Asthma meds.         Lab Results   Component Value Date/Time    WBC 8.3 12/20/2017 03:24 PM    HGB 12.8 12/20/2017 03:24 PM    HCT 39.3 12/20/2017 03:24 PM    PLATELET 116 40/55/2632 03:24 PM         2. Mild intermittent asthma without complication    3. History of CVA (cerebrovascular accident)    4. Dyslipidemia  -     APOLIPOPROTEIN B  -     LIPID PANEL    5. Essential hypertension  -     CBC WITH AUTOMATED DIFF  -     COLLECTION VENOUS BLOOD,VENIPUNCTURE  -     METABOLIC PANEL, COMPREHENSIVE  -     URINALYSIS W/ RFLX MICROSCOPIC    6. Prostatism  -     PROSTATE SPECIFIC AG    7. Screening for alcoholism  -     Annual  Alcohol Screen 15 min ()    8. Screening for depression  -     Depression Screen Annual    9. Wellness examination      There are no preventive care reminders to display for this patient.

## 2018-04-25 NOTE — PROGRESS NOTES
1. Have you been to the ER, urgent care clinic since your last visit? Hospitalized since your last visit? No    2. Have you seen or consulted any other health care providers outside of the [address] [place] [address] since your last visit? Include any pap smears or colon screening.  No

## 2018-04-26 LAB
ALBUMIN SERPL-MCNC: 4.2 G/DL (ref 3.5–4.8)
ALBUMIN/GLOB SERPL: 1.7 {RATIO} (ref 1.2–2.2)
ALP SERPL-CCNC: 76 IU/L (ref 39–117)
ALT SERPL-CCNC: 19 IU/L (ref 0–44)
APO B SERPL-MCNC: 93 MG/DL (ref 52–135)
APPEARANCE UR: ABNORMAL
AST SERPL-CCNC: 16 IU/L (ref 0–40)
BASOPHILS # BLD AUTO: 0 X10E3/UL (ref 0–0.2)
BASOPHILS NFR BLD AUTO: 0 %
BILIRUB SERPL-MCNC: 1 MG/DL (ref 0–1.2)
BILIRUB UR QL STRIP: NEGATIVE
BUN SERPL-MCNC: 11 MG/DL (ref 8–27)
BUN/CREAT SERPL: 15 (ref 10–24)
CALCIUM SERPL-MCNC: 9.5 MG/DL (ref 8.6–10.2)
CHLORIDE SERPL-SCNC: 106 MMOL/L (ref 96–106)
CHOLEST SERPL-MCNC: 148 MG/DL (ref 100–199)
CO2 SERPL-SCNC: 23 MMOL/L (ref 18–29)
COLOR UR: YELLOW
CREAT SERPL-MCNC: 0.75 MG/DL (ref 0.76–1.27)
EOSINOPHIL # BLD AUTO: 0.6 X10E3/UL (ref 0–0.4)
EOSINOPHIL NFR BLD AUTO: 7 %
ERYTHROCYTE [DISTWIDTH] IN BLOOD BY AUTOMATED COUNT: 14.6 % (ref 12.3–15.4)
GFR SERPLBLD CREATININE-BSD FMLA CKD-EPI: 104 ML/MIN/1.73
GFR SERPLBLD CREATININE-BSD FMLA CKD-EPI: 90 ML/MIN/1.73
GLOBULIN SER CALC-MCNC: 2.5 G/DL (ref 1.5–4.5)
GLUCOSE SERPL-MCNC: 91 MG/DL (ref 65–99)
GLUCOSE UR QL: NEGATIVE
HCT VFR BLD AUTO: 39.7 % (ref 37.5–51)
HDLC SERPL-MCNC: 48 MG/DL
HGB BLD-MCNC: 12.6 G/DL (ref 13–17.7)
HGB UR QL STRIP: NEGATIVE
IMM GRANULOCYTES # BLD: 0 X10E3/UL (ref 0–0.1)
IMM GRANULOCYTES NFR BLD: 0 %
KETONES UR QL STRIP: NEGATIVE
LDLC SERPL CALC-MCNC: 81 MG/DL (ref 0–99)
LEUKOCYTE ESTERASE UR QL STRIP: NEGATIVE
LYMPHOCYTES # BLD AUTO: 1.9 X10E3/UL (ref 0.7–3.1)
LYMPHOCYTES NFR BLD AUTO: 24 %
MCH RBC QN AUTO: 26.7 PG (ref 26.6–33)
MCHC RBC AUTO-ENTMCNC: 31.7 G/DL (ref 31.5–35.7)
MCV RBC AUTO: 84 FL (ref 79–97)
MICRO URNS: ABNORMAL
MONOCYTES # BLD AUTO: 0.8 X10E3/UL (ref 0.1–0.9)
MONOCYTES NFR BLD AUTO: 11 %
NEUTROPHILS # BLD AUTO: 4.4 X10E3/UL (ref 1.4–7)
NEUTROPHILS NFR BLD AUTO: 58 %
NITRITE UR QL STRIP: NEGATIVE
PH UR STRIP: 5.5 [PH] (ref 5–7.5)
PLATELET # BLD AUTO: 219 X10E3/UL (ref 150–379)
POTASSIUM SERPL-SCNC: 3.8 MMOL/L (ref 3.5–5.2)
PROT SERPL-MCNC: 6.7 G/DL (ref 6–8.5)
PROT UR QL STRIP: NEGATIVE
PSA SERPL-MCNC: 0.8 NG/ML (ref 0–4)
RBC # BLD AUTO: 4.72 X10E6/UL (ref 4.14–5.8)
SODIUM SERPL-SCNC: 144 MMOL/L (ref 134–144)
SP GR UR: 1.03 (ref 1–1.03)
TRIGL SERPL-MCNC: 95 MG/DL (ref 0–149)
UROBILINOGEN UR STRIP-MCNC: 0.2 MG/DL (ref 0.2–1)
VLDLC SERPL CALC-MCNC: 19 MG/DL (ref 5–40)
WBC # BLD AUTO: 7.7 X10E3/UL (ref 3.4–10.8)

## 2018-04-26 NOTE — ASSESSMENT & PLAN NOTE
Uncontrolled, based on history, physical exam and review of pertinent labs, studies and medications; meds reconciled; lifestyle modifications recommended. Key COPD Medications     Patient is on no COPD/Asthma meds.         Lab Results   Component Value Date/Time    WBC 8.3 12/20/2017 03:24 PM    HGB 12.8 12/20/2017 03:24 PM    HCT 39.3 12/20/2017 03:24 PM    PLATELET 297 99/81/2353 03:24 PM

## 2018-05-04 RX ORDER — ATORVASTATIN CALCIUM 80 MG/1
TABLET, FILM COATED ORAL
Qty: 90 TAB | Refills: 3 | Status: SHIPPED | OUTPATIENT
Start: 2018-05-04 | End: 2019-08-29 | Stop reason: SDUPTHER

## 2018-05-10 ENCOUNTER — TELEPHONE (OUTPATIENT)
Dept: NEUROLOGY | Age: 73
End: 2018-05-10

## 2018-05-10 NOTE — TELEPHONE ENCOUNTER
Re: Namzaric    PA submitted via CM to [employer]. Auto approved. Case ID: [id number]. Auth good 5/10/18 - 5/10/19. Zeeshan WESLEY Case ID: [id number] Need help?  Call us at [phone]   Outcome   Approved today   Case Id: [id number]  Status: Approved  Coverage Start Date: 05/10/2018  Coverage End Date: 05/10/2019      Manually faxed approval to [facility].  Fax confirmation received 5/10/18 @ 3:50pm.

## 2018-08-15 ENCOUNTER — OFFICE VISIT (OUTPATIENT)
Dept: NEUROLOGY | Age: 73
End: 2018-08-15

## 2018-08-15 VITALS
WEIGHT: 216 LBS | HEART RATE: 71 BPM | DIASTOLIC BLOOD PRESSURE: 82 MMHG | OXYGEN SATURATION: 98 % | RESPIRATION RATE: 18 BRPM | BODY MASS INDEX: 31.9 KG/M2 | SYSTOLIC BLOOD PRESSURE: 142 MMHG

## 2018-08-15 DIAGNOSIS — Z86.73 HISTORY OF CVA (CEREBROVASCULAR ACCIDENT): ICD-10-CM

## 2018-08-15 DIAGNOSIS — I10 ESSENTIAL HYPERTENSION: ICD-10-CM

## 2018-08-15 DIAGNOSIS — E78.5 DYSLIPIDEMIA: ICD-10-CM

## 2018-08-15 DIAGNOSIS — G30.1 LATE ONSET ALZHEIMER'S DISEASE WITHOUT BEHAVIORAL DISTURBANCE (HCC): Primary | ICD-10-CM

## 2018-08-15 DIAGNOSIS — F02.80 LATE ONSET ALZHEIMER'S DISEASE WITHOUT BEHAVIORAL DISTURBANCE (HCC): Primary | ICD-10-CM

## 2018-08-15 RX ORDER — DONEPEZIL HYDROCHLORIDE 10 MG/1
10 TABLET, FILM COATED ORAL
Qty: 30 TAB | Refills: 0 | Status: SHIPPED | OUTPATIENT
Start: 2018-08-15 | End: 2018-09-13 | Stop reason: SDUPTHER

## 2018-08-15 NOTE — PATIENT INSTRUCTIONS

## 2018-08-15 NOTE — MR AVS SNAPSHOT
[facility] [address] 
[phone] Patient: Carlyn Barnes MRN: [id number] :1945 Visit Information Date & Time Provider Department Dept. Phone Encounter #  
 8/15/2018  1:00 PM Eve Hurd, [facility] [place] [phone] Follow-up Instructions Return in about 6 months (around 2/15/2019). Your Appointments 10/31/2018  3:00 PM  
Any with Susy Ballesteros MD  
59 [facility] ([address]) Appt Note: 6 month follow up [address], [place] [address] [facility] 98  
  
   
 [address], [place] [address] Upcoming Health Maintenance Date Due Influenza Age 5 to Adult 2018 FOBT Q 1 YEAR AGE 50-75 2018* Pneumococcal 65+ Low/Medium Risk (2 of 2 - PPSV23) 2018 MEDICARE YEARLY EXAM 2019 GLAUCOMA SCREENING Q2Y 10/27/2019 DTaP/Tdap/Td series (2 - Td) 2027 *Topic was postponed. The date shown is not the original due date. Allergies as of 8/15/2018  Review Complete On: 8/15/2018 By: Eve Hurd DO No Known Allergies Current Immunizations  Reviewed on 2012 No immunizations on file. Not reviewed this visit You Were Diagnosed With   
  
 Codes Comments Late onset Alzheimer's disease without behavioral disturbance    -  Primary ICD-10-CM: G30.1, F02.80 ICD-9-CM: 331.0, 294.10 History of CVA (cerebrovascular accident)     ICD-10-CM: Z86.73 
ICD-9-CM: V12.54 Dyslipidemia     ICD-10-CM: E78.5 ICD-9-CM: 272.4 Essential hypertension     ICD-10-CM: I10 
ICD-9-CM: 401.9 Vitals BP Pulse Resp Weight(growth percentile) SpO2 BMI  
 142/82 71 18 216 lb (98 kg) 98% 31.9 kg/m2 Smoking Status Current Some Day Smoker Vitals History BMI and BSA Data Body Mass Index Body Surface Area 31.9 kg/m 2 2.18 m 2 Preferred Pharmacy Pharmacy Name Phone [facility]/PH[facility]ACY #[id number] Counts include 234 beds at the [facility] [phone] Your Updated Medication List  
  
   
This list is accurate as of 8/15/18  1:17 PM.  Always use your most recent med list.  
  
  
  
  
 aspirin 325 mg tablet Commonly known as:  ASPIRIN Take 325 mg by mouth daily. atorvastatin 80 mg tablet Commonly known as:  LIPITOR  
TAKE 1 TABLET BY MOUTH EVERY DAY  
  
 cyanocobalamin 500 mcg tablet Commonly known as:  VITAMIN B12 Take 2 Tabs by mouth daily. donepezil 10 mg tablet Commonly known as:  ARICEPT Take 1 Tab by mouth nightly. finasteride 5 mg tablet Commonly known as:  PROSCAR  
TAKE 1 TABLET BY MOUTH EVERY DAY  
  
 losartan 50 mg tablet Commonly known as:  COZAAR  
TAKE 1 TABLET BY MOUTH EVERY DAY  
  
 oxybutynin 5 mg tablet Commonly known as:  JALLOSHV Take 5 mg by mouth three (3) times daily. Prescriptions Sent to Pharmacy Refills  
 donepezil (ARICEPT) 10 mg tablet 0 Sig: Take 1 Tab by mouth nightly. Class: Normal  
 Pharmacy: [facility]/pharmacy #[id number] #: [phone] Route: Oral  
  
Follow-up Instructions Return in about 6 months (around 2/15/2019). Patient Instructions A Healthy Lifestyle: Care Instructions Your Care Instructions A healthy lifestyle can help you feel good, stay at a healthy weight, and have plenty of energy for both work and play. A healthy lifestyle is something you can share with your whole family. A healthy lifestyle also can lower your risk for serious health problems, such as high blood pressure, heart disease, and diabetes. You can follow a few steps listed below to improve your health and the health of your family. Follow-up care is a key part of your treatment and safety. Be sure to make and go to all appointments, and call your doctor if you are having problems. It's also a good idea to know your test results and keep a list of the medicines you take. How can you care for yourself at home? · Do not eat too much sugar, fat, or fast foods. You can still have dessert and treats now and then. The goal is moderation. · Start small to improve your eating habits. Pay attention to portion sizes, drink less juice and soda pop, and eat more fruits and vegetables. ¨ Eat a healthy amount of food. A 3-ounce serving of meat, for example, is about the size of a deck of cards. Fill the rest of your plate with vegetables and whole grains. ¨ Limit the amount of soda and sports drinks you have every day. Drink more water when you are thirsty. ¨ Eat at least 5 servings of fruits and vegetables every day. It may seem like a lot, but it is not hard to reach this goal. A serving or helping is 1 piece of fruit, 1 cup of vegetables, or 2 cups of leafy, raw vegetables. Have an apple or some carrot sticks as an afternoon snack instead of a candy bar. Try to have fruits and/or vegetables at every meal. 
· Make exercise part of your daily routine. You may want to start with simple activities, such as walking, bicycling, or slow swimming. Try to be active 30 to 60 minutes every day. You do not need to do all 30 to 60 minutes all at once. For example, you can exercise 3 times a day for 10 or 20 minutes. Moderate exercise is safe for most people, but it is always a good idea to talk to your doctor before starting an exercise program. 
· Keep moving. [place] Bun the lawn, work in the garden, or [place]. Take the stairs instead of the elevator at work. · If you smoke, quit. People who smoke have an increased risk for heart attack, stroke, cancer, and other lung illnesses.  Quitting is hard, but there are ways to boost your chance of quitting tobacco for good. ¨ Use nicotine gum, patches, or lozenges. ¨ Ask your doctor about stop-smoking programs and medicines. ¨ Keep trying. In addition to reducing your risk of diseases in the future, you will notice some benefits soon after you stop using tobacco. If you have shortness of breath or asthma symptoms, they will likely get better within a few weeks after you quit. · Limit how much alcohol you drink. Moderate amounts of alcohol (up to 2 drinks a day for men, 1 drink a day for women) are okay. But drinking too much can lead to liver problems, high blood pressure, and other health problems. Family health If you have a family, there are many things you can do together to improve your health. · Eat meals together as a family as often as possible. · Eat healthy foods. This includes fruits, vegetables, lean meats and dairy, and whole grains. · Include your family in your fitness plan. Most people think of activities such as jogging or tennis as the way to fitness, but there are many ways you and your family can be more active. Anything that makes you breathe hard and gets your heart pumping is exercise. Here are some tips: 
¨ Walk to do errands or to take your child to school or the bus. ¨ Go for a family bike ride after dinner instead of watching TV. Where can you learn more? Go to http://altaf-logan.info/. Enter R917 in the search box to learn more about \"A Healthy Lifestyle: Care Instructions. \" Current as of: December 7, 2017 Content Version: 11.7 © 4102-4170 [employer]. Care instructions adapted under license by [employer] (which disclaims liability or warranty for this information). If you have questions about a medical condition or this instruction, always ask your healthcare professional. [address] any warranty or liability for your use of this information. Introducing [facility] [place] [facility]! Dear Haroon Regan: Thank you for requesting a [employer] account. Our records indicate that you already have an active [employer] account. You can access your account anytime at [email or web address] Did you know that you can access your hospital and ER discharge instructions at any time in [employer]? You can also review all of your test results from your hospital stay or ER visit. Additional Information If you have questions, please visit the Frequently Asked Questions section of the [employer] website at [email or web address] Remember, [employer] is NOT to be used for urgent needs. For medical emergencies, dial 911. Now available from your iPhone and Android! Please provide this summary of care documentation to your next provider. Your primary care clinician is listed as Feli Marlow. If you have any questions after today's visit, please call [phone].

## 2018-08-15 NOTE — PROGRESS NOTES
Neurology Clinic Follow up Note    Patient ID:  Carlyn [facility]  [id number]  47 y.o.  1945      Mr. Martha Arambula is here for follow up today of dementia         Last Appointment With Me:  1/22/18      Interval History:   Pt returns for f/u of memory impairment. He feels memory is stable to improved. Wife reports that he is slightly agitated at times. Off of Namzaric x 2 months- felt medication was exacerbating headaches. Headaches improved after stopping medication. He is taking B12 supplementation daily. Ability to function:  Driving: yes, rides motorcycles, some difficulty with navigation in the past.  He feels this has improved. Finances: Wife has taken over paying bills for the most part  Manages own medication: assisted by his wife. Resides: at home with his wife  Fhx dementia: +father      PMHx/ PSHx/ FHx/ SHx:  Reviewed and unchanged previous visit. Past Medical History:   Diagnosis Date    [facility] pulmonary [facility] [address]     Hypercholesterolemia     Hypertension     Sarcoidosis, lung ([facility] [address]     Stroke ([facility]     CVA in distribution RMA---no sequelae    Thyroid disease          ROS:  Comprehensive review of systems negative except for as noted above. Objective:       Meds:  Current Outpatient Prescriptions   Medication Sig Dispense Refill    atorvastatin (LIPITOR) 80 mg tablet TAKE 1 TABLET BY MOUTH EVERY DAY 90 Tab 3    losartan (COZAAR) 50 mg tablet TAKE 1 TABLET BY MOUTH EVERY DAY 90 Tab 3    oxybutynin (DITROPAN) 5 mg tablet Take 5 mg by mouth three (3) times daily.  finasteride (PROSCAR) 5 mg tablet TAKE 1 TABLET BY MOUTH EVERY DAY 30 Tab 11    cyanocobalamin (VITAMIN B12) 500 mcg tablet Take 2 Tabs by mouth daily. 60 Tab 5    aspirin (ASPIRIN) 325 mg tablet Take 325 mg by mouth daily.          Exam:  Visit Vitals    /82    Pulse 71    Resp 18    Wt 98 kg (216 lb)    SpO2 98%    BMI 31.9 kg/m2     NEUROLOGICAL EXAM:  General: Awake, alert, speech fluent. Oriented to month/year, not date. Knows POTUS. CN: PERRL, EOMI without nystagmus, VFF to confrontation, facial sensation and strength are normal and symmetric, hearing is intact to finger rub bilaterally, palate and tongue movements are intact and symmetric. Motor: Normal tone, bulk and strength bilaterally. Reflexes: 1/4 and symmetric, plantar stimulation is flexor. Coordination: FNF, GI, HTS intact. Sensation: LT intact throughout. Gait: Normal-based and steady. LABS  Results for orders placed or performed in visit on 04/25/18   APOLIPOPROTEIN B   Result Value Ref Range    Apolipoprotein B 93 52 - 135 mg/dL   CBC WITH AUTOMATED DIFF   Result Value Ref Range    WBC 7.7 3.4 - 10.8 x10E3/uL    RBC 4.72 4.14 - 5.80 x10E6/uL    HGB 12.6 (L) 13.0 - 17.7 g/dL    HCT 39.7 37.5 - 51.0 %    MCV 84 79 - 97 fL    MCH 26.7 26.6 - 33.0 pg    MCHC 31.7 31.5 - 35.7 g/dL    RDW 14.6 12.3 - 15.4 %    PLATELET 592 378 - 526 x10E3/uL    NEUTROPHILS 58 Not Estab. %    Lymphocytes 24 Not Estab. %    MONOCYTES 11 Not Estab. %    EOSINOPHILS 7 Not Estab. %    BASOPHILS 0 Not Estab. %    ABS. NEUTROPHILS 4.4 1.4 - 7.0 x10E3/uL    Abs Lymphocytes 1.9 0.7 - 3.1 x10E3/uL    ABS. MONOCYTES 0.8 0.1 - 0.9 x10E3/uL    ABS. EOSINOPHILS 0.6 (H) 0.0 - 0.4 x10E3/uL    ABS. BASOPHILS 0.0 0.0 - 0.2 x10E3/uL    IMMATURE GRANULOCYTES 0 Not Estab. %    ABS. IMM.  GRANS. 0.0 0.0 - 0.1 x10E3/uL   LIPID PANEL   Result Value Ref Range    Cholesterol, total 148 100 - 199 mg/dL    Triglyceride 95 0 - 149 mg/dL    HDL Cholesterol 48 >39 mg/dL    VLDL, calculated 19 5 - 40 mg/dL    LDL, calculated 81 0 - 99 mg/dL   METABOLIC PANEL, COMPREHENSIVE   Result Value Ref Range    Glucose 91 65 - 99 mg/dL    BUN 11 8 - 27 mg/dL    Creatinine 0.75 (L) 0.76 - 1.27 mg/dL    GFR est non-AA 90 >59 mL/min/1.73    GFR est  >59 mL/min/1.73    BUN/Creatinine ratio 15 10 - 24    Sodium 144 134 - 144 mmol/L    Potassium 3.8 3.5 - 5.2 mmol/L    Chloride 106 96 - 106 mmol/L    CO2 23 18 - 29 mmol/L    Calcium 9.5 8.6 - 10.2 mg/dL    Protein, total 6.7 6.0 - 8.5 g/dL    Albumin 4.2 3.5 - 4.8 g/dL    GLOBULIN, TOTAL 2.5 1.5 - 4.5 g/dL    A-G Ratio 1.7 1.2 - 2.2    Bilirubin, total 1.0 0.0 - 1.2 mg/dL    Alk. phosphatase 76 39 - 117 IU/L    AST (SGOT) 16 0 - 40 IU/L    ALT (SGPT) 19 0 - 44 IU/L   PSA, DIAGNOSTIC (PROSTATE SPECIFIC AG)   Result Value Ref Range    Prostate Specific Ag 0.8 0.0 - 4.0 ng/mL   URINALYSIS W/ RFLX MICROSCOPIC   Result Value Ref Range    Specific Gravity 1.027 1.005 - 1.030    pH (UA) 5.5 5.0 - 7.5    Color Yellow Yellow    Appearance Cloudy (A) Clear    Leukocyte Esterase Negative Negative    Protein Negative Negative/Trace    Glucose Negative Negative    Ketone Negative Negative    Blood Negative Negative    Bilirubin Negative Negative    Urobilinogen 0.2 0.2 - 1.0 mg/dL    Nitrites Negative Negative    Microscopic Examination Comment        IMAGING:  MRI Results (most recent):    Results from Hospital Encounter encounter on 05/10/17   MRI BRAIN WO CONT   Narrative EXAM:  MRI BRAIN WO CONT  INDICATION:  progressive dementia, M03.90,  TECHNIQUE: Sagittal FLAIR and T1, axial FLAIR, T2,T1 and gradient echo images as  well as coronal T2 weighted images and axial diffusion weighted images of the  head were obtained. COMPARISON:  CT head 2/22/12  FINDINGS:  Mild generalized sulcal and ventricular prominence is consistent with cerebral  volume loss and within the range of normal for age. Multiple foci of T2 hyperintensity in the cerebral white matter with mild  increased since 2011 suggests chronic small vessel ischemic change. The left frank acute infarct demonstrated 7/6/11 now has cystic change. No evidence of hemorrhage, mass, acute infarction or abnormal extra-axial fluid  collections. Flow voids are present in the vertebral basilar and carotid artery systems. The craniocervical junction is unremarkable.    The structures at the cranial base including paranasal sinuses are unremarkable. Impression IMPRESSION:   1. Findings consistent with mild chronic small vessel type ischemic white matter  disease. 2. Old left frank small lacunar infarct. 3. No acute intracranial abnormality demonstrated. Assessment:     Encounter Diagnoses     ICD-10-CM ICD-9-CM   1. Late onset Alzheimer's disease without behavioral disturbance G30.1 331.0    F02.80 294.10   2. History of CVA (cerebrovascular accident) Z86.73 V12.54   3. Dyslipidemia E78.5 272.4   4. Essential hypertension I10 36.9      74year old AAM presenting for f/u of cognitive impairment with slow progression. MOCA 15/30 with significantly impaired visuospatial/executive functioning, attention, delayed recall. Findings c/w moderate AD w/o behavioral disturbance. Discussed need for assistance moving forward when making executive decisions, assistance with medications (using a pill box), assistance with navigation and avoidance of traveling alone. Discussed treatment options in detail including risks/benefits and expectations. While medication is not likely to made a \"night and day\" difference in AD, it may aid modestly in improving concentration and attention. Medication titration and addition of adjunctive agents may be necessary to achieve maximum benefit. Explained that AD is a progressive disease process. While he is not currently exhibiting destructive, paranoid or psychotic behavior, if this becomes an issue in the future we can certainly address this with appropriate medication. MRI Brain independently reviewed with patient revealing evidence of remote L pontine and L lacunar infarction, likely related to small vessel ischemia. Discussed importance of smoking cessation and continued antiplatelet and statin therapy for stroke prevention. Did not tolerate Namzaric due to side effects (headache). Will resume Aricept at previous dosing.   No significant decline in cognition since last visit. Plan:   Resume Aricept 10mg daily  Cont. B12 supplementation 1000mcg daily  Cont. ASA, lipitor for stroke prevention  Goal LDL<70, SBP<140  Heart healthy diet and exercise  Regular scheduled cognitive and social engagement. Follow-up Disposition:  Return in about 6 months (around 2/15/2019).       Signed:  Kimmie Shahid DO  8/15/2018

## 2018-09-13 DIAGNOSIS — G30.1 LATE ONSET ALZHEIMER'S DISEASE WITHOUT BEHAVIORAL DISTURBANCE (HCC): ICD-10-CM

## 2018-09-13 DIAGNOSIS — F02.80 LATE ONSET ALZHEIMER'S DISEASE WITHOUT BEHAVIORAL DISTURBANCE (HCC): ICD-10-CM

## 2018-10-01 RX ORDER — DONEPEZIL HYDROCHLORIDE 10 MG/1
TABLET, FILM COATED ORAL
Qty: 30 TAB | Refills: 0 | Status: SHIPPED | OUTPATIENT
Start: 2018-10-01 | End: 2019-11-08

## 2018-10-31 ENCOUNTER — OFFICE VISIT (OUTPATIENT)
Dept: INTERNAL MEDICINE CLINIC | Age: 73
End: 2018-10-31

## 2018-10-31 VITALS
HEIGHT: 69 IN | RESPIRATION RATE: 16 BRPM | SYSTOLIC BLOOD PRESSURE: 123 MMHG | WEIGHT: 212.8 LBS | TEMPERATURE: 97.9 F | BODY MASS INDEX: 31.52 KG/M2 | HEART RATE: 57 BPM | OXYGEN SATURATION: 97 % | DIASTOLIC BLOOD PRESSURE: 66 MMHG

## 2018-10-31 DIAGNOSIS — Z86.73 HISTORY OF CVA (CEREBROVASCULAR ACCIDENT): ICD-10-CM

## 2018-10-31 DIAGNOSIS — F02.80 LATE ONSET ALZHEIMER'S DISEASE WITHOUT BEHAVIORAL DISTURBANCE (HCC): ICD-10-CM

## 2018-10-31 DIAGNOSIS — G30.1 LATE ONSET ALZHEIMER'S DISEASE WITHOUT BEHAVIORAL DISTURBANCE (HCC): ICD-10-CM

## 2018-10-31 DIAGNOSIS — J44.9 CHRONIC OBSTRUCTIVE PULMONARY DISEASE, UNSPECIFIED COPD TYPE (HCC): ICD-10-CM

## 2018-10-31 DIAGNOSIS — E78.5 DYSLIPIDEMIA: ICD-10-CM

## 2018-10-31 DIAGNOSIS — I10 ESSENTIAL HYPERTENSION: Primary | ICD-10-CM

## 2018-10-31 DIAGNOSIS — N40.0 PROSTATISM: ICD-10-CM

## 2018-10-31 RX ORDER — TAMSULOSIN HYDROCHLORIDE 0.4 MG/1
0.4 CAPSULE ORAL DAILY
Qty: 30 CAP | Refills: 1 | Status: SHIPPED | OUTPATIENT
Start: 2018-10-31 | End: 2019-11-08

## 2018-10-31 NOTE — PROGRESS NOTES
Chief Complaint Patient presents with  COPD  
  6 month follow up 1. Have you been to the ER, urgent care clinic since your last visit? Hospitalized since your last visit? No 
 
2. Have you seen or consulted any other health care providers outside of the [address] [place] [address] since your last visit? Include any pap smears or colon screening.  No

## 2018-10-31 NOTE — LETTER
11/5/2018 9:29 PM 
 
Mr. Farshad Xavier [facility] [phone] Dear Farshad Xavier: Please find your most recent results below. Resulted Orders URINALYSIS W/ RFLX MICROSCOPIC Result Value Ref Range Specific Gravity 1.021 1.005 - 1.030  
 pH (UA) 5.5 5.0 - 7.5 Color Yellow Yellow Appearance Cloudy (A) Clear Leukocyte Esterase 2+ (A) Negative Protein Negative Negative/Trace Glucose Negative Negative Ketone Negative Negative Blood 1+ (A) Negative Bilirubin Negative Negative Urobilinogen 0.2 0.2 - 1.0 mg/dL Nitrites Positive (A) Negative Microscopic Examination See additional order Comment:  
   Microscopic was indicated and was performed. Narrative Performed at:  [address]  [id number] : Milady Staton MD, Phone:  [phone] MICROSCOPIC EXAMINATION Result Value Ref Range WBC 11-30 (A) 0 - 5 /hpf  
 RBC 3-10 (A) 0 - 2 /hpf Epithelial cells 0-10 0 - 10 /hpf Casts None seen None seen /lpf Mucus Present Not Estab. Bacteria Many (A) None seen/Few Narrative Performed at:  [address]  [id number] : Milady Staton MD, Phone:  [phone] RECOMMENDATIONS: 
You have a prostate infection and medication will be called in for you. Please call me if you have any questions: [phone] Sincerely, Tamera Ibarra MD

## 2018-11-01 LAB
APPEARANCE UR: ABNORMAL
BACTERIA #/AREA URNS HPF: ABNORMAL /[HPF]
BILIRUB UR QL STRIP: NEGATIVE
CASTS URNS QL MICRO: ABNORMAL /LPF
COLOR UR: YELLOW
EPI CELLS #/AREA URNS HPF: ABNORMAL /HPF
GLUCOSE UR QL: NEGATIVE
HGB UR QL STRIP: ABNORMAL
KETONES UR QL STRIP: NEGATIVE
LEUKOCYTE ESTERASE UR QL STRIP: ABNORMAL
MICRO URNS: ABNORMAL
MUCOUS THREADS URNS QL MICRO: PRESENT
NITRITE UR QL STRIP: POSITIVE
PH UR STRIP: 5.5 [PH] (ref 5–7.5)
PROT UR QL STRIP: NEGATIVE
RBC #/AREA URNS HPF: ABNORMAL /HPF
SP GR UR: 1.02 (ref 1–1.03)
UROBILINOGEN UR STRIP-MCNC: 0.2 MG/DL (ref 0.2–1)
WBC #/AREA URNS HPF: ABNORMAL /HPF

## 2018-11-04 NOTE — PROGRESS NOTES
[address] [place] [address] and Primary Care 
[place] [address] 
[address] [facility] [address] Phone:  [phone]  Fax: [phone] Chief Complaint Patient presents with  COPD  
  6 month follow up Valeria Enrique SUBJECTIVE: 
  Roni Mccoy is a 74 y.o. male Comes in accompanied by his wife. His chief complaint is that of increased frequency of urination and periodic incontinence. This has been a worsening problem over the last several years. He was initially seeing Dr. Rapp Staff and subsequently Dr. Daniel Peralta, and finally he settled on Dr. Nabila Gautam. His dementia is getting gradually worse. He remains functional and communicates reasonably well. He follows up with neurology and is indeed taking medication related to his dementia. Unfortunately he continues to smoke cigarettes. He has a past history of primary hypertension and dyslipidemia. He is surprisingly compliant with his medication. Current Outpatient Medications Medication Sig Dispense Refill  tamsulosin (FLOMAX) 0.4 mg capsule Take 1 Cap by mouth daily. 30 Cap 1  
 donepezil (ARICEPT) 10 mg tablet TAKE 1 TABLET BY MOUTH EVERY DAY AT NIGHT 30 Tab 0  
 finasteride (PROSCAR) 5 mg tablet TAKE 1 TABLET BY MOUTH EVERY DAY 30 Tab 11  
 atorvastatin (LIPITOR) 80 mg tablet TAKE 1 TABLET BY MOUTH EVERY DAY 90 Tab 3  
 losartan (COZAAR) 50 mg tablet TAKE 1 TABLET BY MOUTH EVERY DAY 90 Tab 3  
 oxybutynin (DITROPAN) 5 mg tablet Take 5 mg by mouth three (3) times daily.  cyanocobalamin (VITAMIN B12) 500 mcg tablet Take 2 Tabs by mouth daily. 60 Tab 5  
 aspirin (ASPIRIN) 325 mg tablet Take 325 mg by mouth daily. Past Medical History:  
Diagnosis Date  Chronic obstructive pulmonary disease ([facility]  Hypercholesterolemia  Hypertension  Sarcoidosis, lung ([facility]  Stroke ([facility] CVA in distribution RMA---no sequelae  Thyroid disease Past Surgical History:  
Procedure Laterality Date  HX COLONOSCOPY No Known Allergies REVIEW OF SYSTEMS: 
General: negative for - chills or fever ENT: negative for - headaches, nasal congestion or tinnitus Respiratory: negative for - cough, hemoptysis, shortness of breath or wheezing Cardiovascular : negative for - chest pain, edema, palpitations or shortness of breath Gastrointestinal: negative for - abdominal pain, blood in stools, heartburn or nausea/vomiting Genito-Urinary: no dysuria, trouble voiding, or hematuria Musculoskeletal: negative for - gait disturbance, joint pain, joint stiffness or joint swelling Neurological: no TIA or stroke symptoms Hematologic: no bruises, no bleeding, no swollen glands Integument: no lumps, mole changes, nail changes or rash Endocrine: no malaise/lethargy or unexpected weight changes Social History Socioeconomic History  Marital status:  Spouse name: Bam Palm  Number of children: Not on file  Years of education: 3  
 Highest education level: Not on file Social Needs  Financial resource strain: Not on file  Food insecurity - worry: Not on file  Food insecurity - inability: Not on file  Transportation needs - medical: Not on file  Transportation needs - non-medical: Not on file Occupational History  Occupation: Retired Tobacco Use  Smoking status: Current Some Day Smoker Packs/day: 0.25  Smokeless tobacco: Never Used Substance and Sexual Activity  Alcohol use: Yes Comment: Socially  Drug use: No  
 Sexual activity: Not on file Other Topics Concern  Not on file Social History Narrative  Not on file Family History Problem Relation Age of Onset  Diabetes Mother  Diabetes Brother OBJECTIVE: 
 
Visit Vitals /66 Pulse (!) 57 Temp 97.9 °F (36.6 °C) (Oral) Resp 16 Ht 5' 9\" (1.753 m) Wt 212 lb 12.8 oz (96.5 kg) SpO2 97% BMI 31.43 kg/m² CONSTITUTIONAL: well , well nourished, appears age appropriate EYES: perrla, eom intact ENMT:moist mucous membranes, pharynx clear NECK: supple. Thyroid normal 
RESPIRATORY: Chest: clear to ascultation and percussion CARDIOVASCULAR: Heart: regular rate and rhythm GASTROINTESTINAL: Abdomen: soft, bowel sounds active HEMATOLOGIC: no pathological lymph nodes palpated MUSCULOSKELETAL: Extremities: no edema, pulse 1+ INTEGUMENT: No unusual rashes or suspicious skin lesions noted. Nails appear normal. 
NEUROLOGIC: non-focal exam  
MENTAL STATUS: alert and oriented, appropriate affect ASSESSMENT: 
1. Essential hypertension 2. Late onset Alzheimer's disease without behavioral disturbance 3. Prostatism 4. History of CVA (cerebrovascular accident) 5. Dyslipidemia 6. Chronic obstructive pulmonary disease, unspecified COPD type ([facility] PLAN: 
 
1. His blood pressure is excellent today, no adjustments are made. 2. As far as his dementia is concerned, there is not that much else that can be added. We will follow up with neurology. 3. Probably has an element of prostatism, but it may be more complicated than that, specifically neurogenic bladder. He needs to return to see Dr. Audrey Sandoval, the urologist.  In the intervening time I will try him on Flomax to see if this will help. He is already taking Finasteride. 4. He has no sequelae from his CVA. 5. He will continue a statin in view of his secondary cardiovascular risk prevention status. 6. I again encouraged him to discontinue cigarette smoking. He has existing COPD, but more importantly it is a proatherogenic process that needs to be reduced to prevent potential for development of another CVA. . 
Orders Placed This Encounter  URINALYSIS W/ RFLX MICROSCOPIC  MICROSCOPIC EXAMINATION  
 REFERRAL TO UROLOGY  tamsulosin (FLOMAX) 0.4 mg capsule Follow-up Disposition: 
Return in about 4 months (around 2/28/2019). Tamera Ibarra MD

## 2018-11-05 DIAGNOSIS — N41.0 ACUTE PROSTATITIS: Primary | ICD-10-CM

## 2018-11-05 RX ORDER — CIPROFLOXACIN 500 MG/1
500 TABLET ORAL 2 TIMES DAILY
Qty: 28 TAB | Refills: 0 | Status: SHIPPED | OUTPATIENT
Start: 2018-11-05 | End: 2018-11-15

## 2018-11-07 ENCOUNTER — TELEPHONE (OUTPATIENT)
Dept: INTERNAL MEDICINE CLINIC | Age: 73
End: 2018-11-07

## 2019-04-24 ENCOUNTER — OFFICE VISIT (OUTPATIENT)
Dept: INTERNAL MEDICINE CLINIC | Age: 74
End: 2019-04-24

## 2019-04-24 VITALS
HEIGHT: 69 IN | WEIGHT: 227.2 LBS | TEMPERATURE: 97.5 F | DIASTOLIC BLOOD PRESSURE: 71 MMHG | SYSTOLIC BLOOD PRESSURE: 149 MMHG | HEART RATE: 67 BPM | OXYGEN SATURATION: 100 % | RESPIRATION RATE: 18 BRPM | BODY MASS INDEX: 33.65 KG/M2

## 2019-04-24 DIAGNOSIS — G30.1 LATE ONSET ALZHEIMER'S DISEASE WITHOUT BEHAVIORAL DISTURBANCE (HCC): ICD-10-CM

## 2019-04-24 DIAGNOSIS — I10 ESSENTIAL HYPERTENSION: Primary | ICD-10-CM

## 2019-04-24 DIAGNOSIS — J45.20 MILD INTERMITTENT ASTHMA WITHOUT COMPLICATION: ICD-10-CM

## 2019-04-24 DIAGNOSIS — J44.9 CHRONIC OBSTRUCTIVE PULMONARY DISEASE, UNSPECIFIED COPD TYPE (HCC): ICD-10-CM

## 2019-04-24 DIAGNOSIS — N40.0 PROSTATISM: ICD-10-CM

## 2019-04-24 DIAGNOSIS — E78.5 DYSLIPIDEMIA: ICD-10-CM

## 2019-04-24 DIAGNOSIS — F02.80 LATE ONSET ALZHEIMER'S DISEASE WITHOUT BEHAVIORAL DISTURBANCE (HCC): ICD-10-CM

## 2019-04-24 RX ORDER — AMOXICILLIN 500 MG/1
TABLET, FILM COATED ORAL
COMMUNITY
End: 2019-11-08

## 2019-04-24 RX ORDER — OXYCODONE AND ACETAMINOPHEN 5; 325 MG/1; MG/1
TABLET ORAL
COMMUNITY
End: 2019-11-08

## 2019-04-24 RX ORDER — IBUPROFEN 800 MG/1
TABLET ORAL
COMMUNITY
End: 2019-11-08

## 2019-04-24 NOTE — PROGRESS NOTES
1. Have you been to the ER, urgent care clinic since your last visit? Hospitalized since your last visit? No    2. Have you seen or consulted any other health care providers outside of the [address] [place] [address] since your last visit? Include any pap smears or colon screening.  No

## 2019-04-25 LAB
ALBUMIN SERPL-MCNC: 4.2 G/DL (ref 3.5–4.8)
ALBUMIN/GLOB SERPL: 1.8 {RATIO} (ref 1.2–2.2)
ALP SERPL-CCNC: 86 IU/L (ref 39–117)
ALT SERPL-CCNC: 18 IU/L (ref 0–44)
APO B SERPL-MCNC: 95 MG/DL
APPEARANCE UR: CLEAR
AST SERPL-CCNC: 23 IU/L (ref 0–40)
BASOPHILS # BLD AUTO: 0 X10E3/UL (ref 0–0.2)
BASOPHILS NFR BLD AUTO: 0 %
BILIRUB SERPL-MCNC: 0.9 MG/DL (ref 0–1.2)
BILIRUB UR QL STRIP: NEGATIVE
BUN SERPL-MCNC: 12 MG/DL (ref 8–27)
BUN/CREAT SERPL: 12 (ref 10–24)
CALCIUM SERPL-MCNC: 9.4 MG/DL (ref 8.6–10.2)
CHLORIDE SERPL-SCNC: 108 MMOL/L (ref 96–106)
CHOLEST SERPL-MCNC: 167 MG/DL (ref 100–199)
CO2 SERPL-SCNC: 23 MMOL/L (ref 20–29)
COLOR UR: YELLOW
CREAT SERPL-MCNC: 1.04 MG/DL (ref 0.76–1.27)
EOSINOPHIL # BLD AUTO: 0.4 X10E3/UL (ref 0–0.4)
EOSINOPHIL NFR BLD AUTO: 5 %
ERYTHROCYTE [DISTWIDTH] IN BLOOD BY AUTOMATED COUNT: 15 % (ref 12.3–15.4)
GLOBULIN SER CALC-MCNC: 2.4 G/DL (ref 1.5–4.5)
GLUCOSE SERPL-MCNC: 90 MG/DL (ref 65–99)
GLUCOSE UR QL: NEGATIVE
HCT VFR BLD AUTO: 38.5 % (ref 37.5–51)
HDLC SERPL-MCNC: 51 MG/DL
HGB BLD-MCNC: 13 G/DL (ref 13–17.7)
HGB UR QL STRIP: NEGATIVE
IMM GRANULOCYTES # BLD AUTO: 0 X10E3/UL (ref 0–0.1)
IMM GRANULOCYTES NFR BLD AUTO: 0 %
KETONES UR QL STRIP: NEGATIVE
LDLC SERPL CALC-MCNC: 100 MG/DL (ref 0–99)
LEUKOCYTE ESTERASE UR QL STRIP: NEGATIVE
LYMPHOCYTES # BLD AUTO: 2 X10E3/UL (ref 0.7–3.1)
LYMPHOCYTES NFR BLD AUTO: 26 %
MCH RBC QN AUTO: 27.5 PG (ref 26.6–33)
MCHC RBC AUTO-ENTMCNC: 33.8 G/DL (ref 31.5–35.7)
MCV RBC AUTO: 82 FL (ref 79–97)
MICRO URNS: NORMAL
MONOCYTES # BLD AUTO: 0.6 X10E3/UL (ref 0.1–0.9)
MONOCYTES NFR BLD AUTO: 8 %
NEUTROPHILS # BLD AUTO: 4.8 X10E3/UL (ref 1.4–7)
NEUTROPHILS NFR BLD AUTO: 61 %
NITRITE UR QL STRIP: NEGATIVE
PH UR STRIP: 5.5 [PH] (ref 5–7.5)
PLATELET # BLD AUTO: 252 X10E3/UL (ref 150–379)
POTASSIUM SERPL-SCNC: 4.2 MMOL/L (ref 3.5–5.2)
PROT SERPL-MCNC: 6.6 G/DL (ref 6–8.5)
PROT UR QL STRIP: NEGATIVE
PSA SERPL-MCNC: 0.8 NG/ML (ref 0–4)
RBC # BLD AUTO: 4.72 X10E6/UL (ref 4.14–5.8)
SODIUM SERPL-SCNC: 143 MMOL/L (ref 134–144)
SP GR UR: 1.02 (ref 1–1.03)
TRIGL SERPL-MCNC: 80 MG/DL (ref 0–149)
UROBILINOGEN UR STRIP-MCNC: 0.2 MG/DL (ref 0.2–1)
VLDLC SERPL CALC-MCNC: 16 MG/DL (ref 5–40)
WBC # BLD AUTO: 7.8 X10E3/UL (ref 3.4–10.8)

## 2019-04-25 NOTE — PROGRESS NOTES
[address] [facility] and Primary Care  [place] [address]  [address] [place] [address]  Phone:  [phone]  Fax: [phone]       Chief Complaint   Patient presents with    Hypertension   . SUBJECTIVE:    Swapna Man is a 74 y.o. male Comes in for return visit stating that he has done well. He has no complaints. He continues to follow with urology. His biggest problem from a  perspective is frequent urination. This has been evaluated on several occasions by different urologists with no meaningful improvement. Fortunately he is not incontinent. He continues to smoke cigarettes and does have existing COPD. His dementia is about the same according to the wife, but he remains a bit paranoid. He has a past history of primary hypertension and dyslipidemia. He has had a cardiovascular event in the past, which was a CVA. Current Outpatient Medications   Medication Sig Dispense Refill    ibuprofen (MOTRIN) 800 mg tablet Take  by mouth.  amoxicillin 500 mg tab Take  by mouth.  oxyCODONE-acetaminophen (PERCOCET) 5-325 mg per tablet Take  by mouth every four (4) hours as needed for Pain.  tamsulosin (FLOMAX) 0.4 mg capsule Take 1 Cap by mouth daily. 30 Cap 1    donepezil (ARICEPT) 10 mg tablet TAKE 1 TABLET BY MOUTH EVERY DAY AT NIGHT 30 Tab 0    finasteride (PROSCAR) 5 mg tablet TAKE 1 TABLET BY MOUTH EVERY DAY 30 Tab 11    atorvastatin (LIPITOR) 80 mg tablet TAKE 1 TABLET BY MOUTH EVERY DAY 90 Tab 3    losartan (COZAAR) 50 mg tablet TAKE 1 TABLET BY MOUTH EVERY DAY 90 Tab 3    oxybutynin (DITROPAN) 5 mg tablet Take 5 mg by mouth three (3) times daily.  cyanocobalamin (VITAMIN B12) 500 mcg tablet Take 2 Tabs by mouth daily. 60 Tab 5    aspirin (ASPIRIN) 325 mg tablet Take 325 mg by mouth daily.        Past Medical History:   Diagnosis Date    Chronic obstructive pulmonary disease ([address]     Hypercholesterolemia     Hypertension     [facility]osis[facility] lung ([facility]     Stroke [facility])     CVA in distribution RMA---no sequelae    Thyroid disease      Past Surgical History:   Procedure Laterality Date    HX COLONOSCOPY       No Known Allergies      REVIEW OF SYSTEMS:  General: negative for - chills or fever  ENT: negative for - headaches, nasal congestion or tinnitus  Respiratory: negative for - cough, hemoptysis, shortness of breath or wheezing  Cardiovascular : negative for - chest pain, edema, palpitations or shortness of breath  Gastrointestinal: negative for - abdominal pain, blood in stools, heartburn or nausea/vomiting  Genito-Urinary: no dysuria, trouble voiding, or hematuria  Musculoskeletal: negative for - gait disturbance, joint pain, joint stiffness or joint swelling  Neurological: no TIA or stroke symptoms  Hematologic: no bruises, no bleeding, no swollen glands  Integument: no lumps, mole changes, nail changes or rash  Endocrine: no malaise/lethargy or unexpected weight changes      Social History     Socioeconomic History    Marital status:      Spouse name: Neha Barba    Number of children: Not on file    Years of education: 3    Highest education level: Not on file   Occupational History    Occupation: Retired   Tobacco Use    Smoking status: Current Some Day Smoker     Packs/day: 0.25    Smokeless tobacco: Never Used   Substance and Sexual Activity    Alcohol use: Yes     Comment: Socially    Drug use: No     Family History   Problem Relation Age of Onset    Diabetes Mother     Diabetes Brother        OBJECTIVE:    Visit Vitals  /71 (BP 1 Location: Right arm, BP Patient Position: Sitting)   Pulse 67   Temp 97.5 °F (36.4 °C) (Oral)   Resp 18   Ht 5' 9\" (1.753 m)   Wt 227 lb 3.2 oz (103.1 kg)   SpO2 100%   BMI 33.55 kg/m²     CONSTITUTIONAL: well , well nourished, appears age appropriate  EYES: perrla, eom intact  ENMT:moist mucous membranes, pharynx clear  NECK: supple.  Thyroid normal  RESPIRATORY: Chest: clear to ascultation and percussion   CARDIOVASCULAR: Heart: regular rate and rhythm  GASTROINTESTINAL: Abdomen: soft, bowel sounds active  HEMATOLOGIC: no pathological lymph nodes palpated  MUSCULOSKELETAL: Extremities: no edema, pulse 1+   INTEGUMENT: No unusual rashes or suspicious skin lesions noted. Nails appear normal.  NEUROLOGIC: non-focal exam   MENTAL STATUS: alert and oriented, appropriate affect      ASSESSMENT:  1. Essential hypertension    2. Late onset Alzheimer's disease without behavioral disturbance    3. Dyslipidemia    4. Chronic obstructive pulmonary disease, unspecified COPD type ([address]    5. Mild intermittent asthma without complication    6. Prostatism        PLAN:    1. Blood pressure is excellent, no adjustments are made. 2. He will continue statin as prescribed in view of his secondary cardiovascular risk prevention. Efficacy and compliance will be assessed. 3. His dementia is reasonably stable. I encouraged him to remain as physically and mentally active. 4. He does have existing COPD and I again encouraged him to discontinue cigarette smoking. 5. He is wheezing mildly, but nothing severe. This will continue as long as he is smoking. 6. He does have prostatism and remains on Finasteride and Flomax. .  Orders Placed This Encounter    APOLIPOPROTEIN B    CBC WITH AUTOMATED DIFF    LIPID PANEL    METABOLIC PANEL, COMPREHENSIVE    PROSTATE SPECIFIC AG    URINALYSIS W/ RFLX MICROSCOPIC    ibuprofen (MOTRIN) 800 mg tablet    amoxicillin 500 mg tab    oxyCODONE-acetaminophen (PERCOCET) 5-325 mg per tablet         Follow-up and Dispositions    · Return in about 4 months (around 8/24/2019).            Eduardo Garcia MD

## 2019-10-30 ENCOUNTER — OFFICE VISIT (OUTPATIENT)
Dept: INTERNAL MEDICINE CLINIC | Age: 74
End: 2019-10-30

## 2019-10-30 VITALS
TEMPERATURE: 97.7 F | SYSTOLIC BLOOD PRESSURE: 129 MMHG | HEART RATE: 61 BPM | RESPIRATION RATE: 16 BRPM | OXYGEN SATURATION: 96 % | BODY MASS INDEX: 32.32 KG/M2 | WEIGHT: 218.2 LBS | DIASTOLIC BLOOD PRESSURE: 77 MMHG | HEIGHT: 69 IN

## 2019-10-30 DIAGNOSIS — Z00.00 WELLNESS EXAMINATION: ICD-10-CM

## 2019-10-30 DIAGNOSIS — N40.0 PROSTATISM: ICD-10-CM

## 2019-10-30 DIAGNOSIS — I10 ESSENTIAL HYPERTENSION: Primary | ICD-10-CM

## 2019-10-30 DIAGNOSIS — J43.1 PANLOBULAR EMPHYSEMA (HCC): ICD-10-CM

## 2019-10-30 DIAGNOSIS — E78.5 DYSLIPIDEMIA: ICD-10-CM

## 2019-10-30 DIAGNOSIS — F02.80 LATE ONSET ALZHEIMER'S DISEASE WITHOUT BEHAVIORAL DISTURBANCE (HCC): ICD-10-CM

## 2019-10-30 DIAGNOSIS — Z13.39 SCREENING FOR ALCOHOLISM: ICD-10-CM

## 2019-10-30 DIAGNOSIS — Z86.73 HISTORY OF CVA (CEREBROVASCULAR ACCIDENT): ICD-10-CM

## 2019-10-30 DIAGNOSIS — Z13.31 SCREENING FOR DEPRESSION: ICD-10-CM

## 2019-10-30 DIAGNOSIS — G30.1 LATE ONSET ALZHEIMER'S DISEASE WITHOUT BEHAVIORAL DISTURBANCE (HCC): ICD-10-CM

## 2019-10-30 NOTE — PROGRESS NOTES
Chief Complaint   Patient presents with   [address] [place] [address] Annual Wellness Visit     1. Have you been to the ER, urgent care clinic since your last visit? Hospitalized since your last visit? No    2. Have you seen or consulted any other health care providers outside of the [address] [place] [address] since your last visit? Include any pap smears or colon screening.  No

## 2019-10-31 LAB
ALBUMIN SERPL-MCNC: 4.1 G/DL (ref 3.5–4.8)
ALBUMIN/GLOB SERPL: 1.6 {RATIO} (ref 1.2–2.2)
ALP SERPL-CCNC: 83 IU/L (ref 39–117)
ALT SERPL-CCNC: 20 IU/L (ref 0–44)
APO B SERPL-MCNC: 127 MG/DL
APPEARANCE UR: CLEAR
AST SERPL-CCNC: 18 IU/L (ref 0–40)
BACTERIA #/AREA URNS HPF: ABNORMAL /[HPF]
BASOPHILS # BLD AUTO: 0 X10E3/UL (ref 0–0.2)
BASOPHILS NFR BLD AUTO: 0 %
BILIRUB SERPL-MCNC: 0.7 MG/DL (ref 0–1.2)
BILIRUB UR QL STRIP: NEGATIVE
BUN SERPL-MCNC: 9 MG/DL (ref 8–27)
BUN/CREAT SERPL: 9 (ref 10–24)
CALCIUM SERPL-MCNC: 9.2 MG/DL (ref 8.6–10.2)
CASTS URNS QL MICRO: ABNORMAL /LPF
CHLORIDE SERPL-SCNC: 102 MMOL/L (ref 96–106)
CHOLEST SERPL-MCNC: 216 MG/DL (ref 100–199)
CO2 SERPL-SCNC: 21 MMOL/L (ref 20–29)
COLOR UR: YELLOW
CREAT SERPL-MCNC: 0.99 MG/DL (ref 0.76–1.27)
EOSINOPHIL # BLD AUTO: 0.7 X10E3/UL (ref 0–0.4)
EOSINOPHIL NFR BLD AUTO: 10 %
EPI CELLS #/AREA URNS HPF: ABNORMAL /HPF (ref 0–10)
ERYTHROCYTE [DISTWIDTH] IN BLOOD BY AUTOMATED COUNT: 13.5 % (ref 12.3–15.4)
GLOBULIN SER CALC-MCNC: 2.5 G/DL (ref 1.5–4.5)
GLUCOSE SERPL-MCNC: 94 MG/DL (ref 65–99)
GLUCOSE UR QL: NEGATIVE
HCT VFR BLD AUTO: 40.4 % (ref 37.5–51)
HDLC SERPL-MCNC: 52 MG/DL
HGB BLD-MCNC: 13.1 G/DL (ref 13–17.7)
HGB UR QL STRIP: ABNORMAL
IMM GRANULOCYTES # BLD AUTO: 0 X10E3/UL (ref 0–0.1)
IMM GRANULOCYTES NFR BLD AUTO: 0 %
KETONES UR QL STRIP: NEGATIVE
LDLC SERPL CALC-MCNC: 133 MG/DL (ref 0–99)
LEUKOCYTE ESTERASE UR QL STRIP: ABNORMAL
LYMPHOCYTES # BLD AUTO: 1.7 X10E3/UL (ref 0.7–3.1)
LYMPHOCYTES NFR BLD AUTO: 25 %
MCH RBC QN AUTO: 26.7 PG (ref 26.6–33)
MCHC RBC AUTO-ENTMCNC: 32.4 G/DL (ref 31.5–35.7)
MCV RBC AUTO: 82 FL (ref 79–97)
MICRO URNS: ABNORMAL
MONOCYTES # BLD AUTO: 0.6 X10E3/UL (ref 0.1–0.9)
MONOCYTES NFR BLD AUTO: 8 %
MUCOUS THREADS URNS QL MICRO: PRESENT
NEUTROPHILS # BLD AUTO: 4 X10E3/UL (ref 1.4–7)
NEUTROPHILS NFR BLD AUTO: 57 %
NITRITE UR QL STRIP: POSITIVE
PH UR STRIP: 5.5 [PH] (ref 5–7.5)
PLATELET # BLD AUTO: 209 X10E3/UL (ref 150–450)
POTASSIUM SERPL-SCNC: 4.4 MMOL/L (ref 3.5–5.2)
PROT SERPL-MCNC: 6.6 G/DL (ref 6–8.5)
PROT UR QL STRIP: NEGATIVE
PSA SERPL-MCNC: 1.4 NG/ML (ref 0–4)
RBC # BLD AUTO: 4.91 X10E6/UL (ref 4.14–5.8)
RBC #/AREA URNS HPF: ABNORMAL /HPF (ref 0–2)
SODIUM SERPL-SCNC: 141 MMOL/L (ref 134–144)
SP GR UR: 1.01 (ref 1–1.03)
TRIGL SERPL-MCNC: 154 MG/DL (ref 0–149)
UROBILINOGEN UR STRIP-MCNC: 0.2 MG/DL (ref 0.2–1)
VLDLC SERPL CALC-MCNC: 31 MG/DL (ref 5–40)
WBC # BLD AUTO: 6.9 X10E3/UL (ref 3.4–10.8)
WBC #/AREA URNS HPF: ABNORMAL /HPF (ref 0–5)

## 2019-10-31 NOTE — PROGRESS NOTES
[address] [facility] and Primary Care  [facility]  [address] [place] [address]  Phone:  [phone]  Fax: [phone]       Chief Complaint   Patient presents with   [facility] Wellness Visit   . SUBJECTIVE:    Wale Elizabeth is a 74 y.o. male Comes in for return visit stating that he has done reasonably well. Interestingly, for the first time he talks about his memory problems. He has described several incidents where he could not remember where he was actually going. His dementia is gradually getting worse, but he remains functional.  In speaking with his wife, she made a similar comment that his memory deficit is getting progressively worse. He also continues to have increased urinary frequency. He was formerly seeing Dr. Martha Gr and one of his colleagues for a period of time. He has been lost to follow up for the last year. He continues to smoke cigarettes. He has a past history of primary hypertension and dyslipidemia. Current Outpatient Medications   Medication Sig Dispense Refill    finasteride (PROSCAR) 5 mg tablet TAKE 1 TABLET BY MOUTH EVERY DAY 90 Tab 3    losartan (COZAAR) 50 mg tablet TAKE 1 TABLET BY MOUTH EVERY DAY 90 Tab 3    atorvastatin (LIPITOR) 80 mg tablet TAKE 1 TABLET BY MOUTH EVERY DAY 90 Tab 3    ibuprofen (MOTRIN) 800 mg tablet Take  by mouth.  amoxicillin 500 mg tab Take  by mouth.  oxyCODONE-acetaminophen (PERCOCET) 5-325 mg per tablet Take  by mouth every four (4) hours as needed for Pain.  tamsulosin (FLOMAX) 0.4 mg capsule Take 1 Cap by mouth daily. 30 Cap 1    donepezil (ARICEPT) 10 mg tablet TAKE 1 TABLET BY MOUTH EVERY DAY AT NIGHT 30 Tab 0    oxybutynin (DITROPAN) 5 mg tablet Take 5 mg by mouth three (3) times daily.  cyanocobalamin (VITAMIN B12) 500 mcg tablet Take 2 Tabs by mouth daily. 60 Tab 5    aspirin (ASPIRIN) 325 mg tablet Take 325 mg by mouth daily.        Past Medical History:   Diagnosis Date    Chronic obstructive pulmonary disease (HCC)     Hypercholesterolemia     Hypertension     S[facility]osis[facility] lung ([facility]     Stroke ([facility]     CVA in distribution RMA---no sequelae    Thyroid disease      Past Surgical History:   Procedure Laterality Date    HX COLONOSCOPY       No Known Allergies      REVIEW OF SYSTEMS:  General: negative for - chills or fever  ENT: negative for - headaches, nasal congestion or tinnitus  Respiratory: negative for - cough, hemoptysis, shortness of breath or wheezing  Cardiovascular : negative for - chest pain, edema, palpitations or shortness of breath  Gastrointestinal: negative for - abdominal pain, blood in stools, heartburn or nausea/vomiting  Genito-Urinary: no dysuria, trouble voiding, or hematuria  Musculoskeletal: negative for - gait disturbance, joint pain, joint stiffness or joint swelling  Neurological: no TIA or stroke symptoms  Hematologic: no bruises, no bleeding, no swollen glands  Integument: no lumps, mole changes, nail changes or rash  Endocrine: no malaise/lethargy or unexpected weight changes      Social History     Socioeconomic History    Marital status:      Spouse name: Armin Reddy    Number of children: Not on file    Years of education: 3    Highest education level: Not on file   Occupational History    Occupation: Retired   Tobacco Use    Smoking status: Current Some Day Smoker     Packs/day: 0.25    Smokeless tobacco: Never Used   Substance and Sexual Activity    Alcohol use: Yes     Comment: Socially    Drug use: No     Family History   Problem Relation Age of Onset    Diabetes Mother     Diabetes Brother        OBJECTIVE:    Visit Vitals  /77   Pulse 61   Temp 97.7 °F (36.5 °C) (Oral)   Resp 16   Ht 5' 9\" (1.753 m)   Wt 218 lb 3.2 oz (99 kg)   SpO2 96%   BMI 32.22 kg/m²     CONSTITUTIONAL: well , well nourished, appears age appropriate  EYES: perrla, eom intact  ENMT:moist mucous membranes, pharynx clear  NECK: supple.  Thyroid normal  RESPIRATORY: Chest: clear to ascultation and percussion   CARDIOVASCULAR: Heart: regular rate and rhythm  GASTROINTESTINAL: Abdomen: soft, bowel sounds active  HEMATOLOGIC: no pathological lymph nodes palpated  MUSCULOSKELETAL: Extremities: no edema, pulse 1+   INTEGUMENT: No unusual rashes or suspicious skin lesions noted. Nails appear normal.  NEUROLOGIC: non-focal exam   MENTAL STATUS: alert and oriented, appropriate affect      ASSESSMENT:  1. Essential hypertension    2. Dyslipidemia    3. Panlobular emphysema ([facility]    4. History of CVA (cerebrovascular accident)    5. Late onset Alzheimer's disease without behavioral disturbance ([address]    6. Prostatism    7. Screening for alcoholism    8. Screening for depression    9. Wellness examination        PLAN:    1. Blood pressure is reasonable today, no adjustments are made. 2. He will continue statin in view of his significant increased cardiovascular risk. He is in a secondary risk prevention mode in view of his past history of a CVA. 3. He needs to discontinue cigarette smoking. He does have existing COPD, but more importantly he is in a secondary cardiovascular risk prevention mode. Unfortunately with his dementia, the likelihood of him discontinuing cigarette smoking is extremely low. 4. The dementia is getting gradually worse. He does have Alzheimer's disease. 5. He will follow up with urology with an appointment, which will be made. He remains symptomatic with increased frequency of urination to the point of near incontinence. .  Orders Placed This Encounter    Depression Screen Annual    APOLIPOPROTEIN B    CBC WITH AUTOMATED DIFF    LIPID PANEL    METABOLIC PANEL, COMPREHENSIVE    PROSTATE SPECIFIC AG    URINALYSIS W/ RFLX MICROSCOPIC    REFERRAL TO UROLOGY         Follow-up and Dispositions    · Return in about 6 months (around 4/30/2020).            Davi Chaney MD  This is the Subsequent Medicare Annual Wellness Exam, performed 12 months or more after the Initial AWV or the last Subsequent AWV    I have reviewed the patient's medical history in detail and updated the computerized patient record. History     Patient Active Problem List   Diagnosis Code    COPD (chronic obstructive pulmonary disease) ([facility] J44.9    Asthma J45.909    History of CVA (cerebrovascular accident) Z80.78    Dyslipidemia E78.5    Hypertension I10    Prostatism N40.0    Benign prostatic hyperplasia with lower urinary tract symptoms N40.1    Late onset Alzheimer's disease without behavioral disturbance (HCC) G30.1, F02.80    Dementia without behavioral disturbance (HCC) F03.90     Past Medical History:   Diagnosis Date    Chronic obstructive pulmonary disease ([facility]     Hypercholesterolemia     Hypertension     [facility]     Stroke ([facility]     CVA in distribution RMA---no sequelae    Thyroid disease       Past Surgical History:   Procedure Laterality Date    HX COLONOSCOPY       Current Outpatient Medications   Medication Sig Dispense Refill    finasteride (PROSCAR) 5 mg tablet TAKE 1 TABLET BY MOUTH EVERY DAY 90 Tab 3    losartan (COZAAR) 50 mg tablet TAKE 1 TABLET BY MOUTH EVERY DAY 90 Tab 3    atorvastatin (LIPITOR) 80 mg tablet TAKE 1 TABLET BY MOUTH EVERY DAY 90 Tab 3    ibuprofen (MOTRIN) 800 mg tablet Take  by mouth.  amoxicillin 500 mg tab Take  by mouth.  oxyCODONE-acetaminophen (PERCOCET) 5-325 mg per tablet Take  by mouth every four (4) hours as needed for Pain.  tamsulosin (FLOMAX) 0.4 mg capsule Take 1 Cap by mouth daily. 30 Cap 1    donepezil (ARICEPT) 10 mg tablet TAKE 1 TABLET BY MOUTH EVERY DAY AT NIGHT 30 Tab 0    oxybutynin (DITROPAN) 5 mg tablet Take 5 mg by mouth three (3) times daily.  cyanocobalamin (VITAMIN B12) 500 mcg tablet Take 2 Tabs by mouth daily. 60 Tab 5    aspirin (ASPIRIN) 325 mg tablet Take 325 mg by mouth daily.        No Known Allergies    Family History   Problem Relation Age of Onset    Diabetes Mother     Diabetes Brother      Social History     Tobacco Use    Smoking status: Current Some Day Smoker     Packs/day: 0.25    Smokeless tobacco: Never Used   Substance Use Topics    Alcohol use: Yes     Comment: Socially       Depression Risk Factor Screening:     3 most recent PHQ Screens 10/30/2019   PHQ Not Done -   Little interest or pleasure in doing things Not at all   Feeling down, depressed, irritable, or hopeless Not at all   Total Score PHQ 2 0       Alcohol Risk Factor Screening (MALE > 65): Do you average more 1 drink per night or more than 7 drinks a week: No    In the past three months have you have had more than 4 drinks containing alcohol on one occasion: No      Functional Ability and Level of Safety:   Hearing: Hearing is good. Activities of Daily Living: The home contains: no safety equipment. Patient does total self care    Ambulation: with no difficulty    Fall Risk:  Fall Risk Assessment, last 12 mths 10/30/2019   Able to walk? Yes   Fall in past 12 months? No       Abuse Screen:  Patient is not abused    Cognitive Screening   Has your family/caregiver stated any concerns about your memory: yes - Memory deficit secondary to progressive dementia  Cognitive Screening: Abnormal - MMSE (Mini Mental Status Exam)    Patient Care Team   Patient Care Team:  Ana Olivas MD as PCP - General (Internal Medicine)  Ana Olivas MD as PCP - REHABILITATION [facility] Empaneled Provider    Assessment/Plan   Education and counseling provided:  Are appropriate based on today's review and evaluation    Diagnoses and all orders for this visit:    1. Essential hypertension  -     CBC WITH AUTOMATED DIFF  -     COLLECTION VENOUS BLOOD,VENIPUNCTURE  -     METABOLIC PANEL, COMPREHENSIVE  -     URINALYSIS W/ RFLX MICROSCOPIC    2. Dyslipidemia  -     APOLIPOPROTEIN B  -     LIPID PANEL    3. Panlobular emphysema ([address]    4. History of CVA (cerebrovascular accident)    5.  Late onset Alzheimer's disease without behavioral disturbance ([facility]    6. Prostatism  -     PSA, DIAGNOSTIC (PROSTATE SPECIFIC AG)  -     REFERRAL TO UROLOGY    7. Screening for alcoholism  -     [place] ANNUAL ALCOHOL SCREEN 15 MIN    8. Screening for depression  -     DEPRESSION SCREEN ANNUAL    9.  Wellness examination        Health Maintenance Due   Topic Date Due    MEDICARE YEARLY EXAM  04/26/2019    Influenza Age 5 to Adult  08/01/2019    GLAUCOMA SCREENING Q2Y  10/27/2019

## 2019-10-31 NOTE — PATIENT INSTRUCTIONS
Medicare Wellness Visit, Male The best way to live healthy is to have a lifestyle where you eat a well-balanced diet, exercise regularly, limit alcohol use, and quit all forms of tobacco/nicotine, if applicable. Regular preventive services are another way to keep healthy. Preventive services (vaccines, screening tests, monitoring & exams) can help personalize your care plan, which helps you manage your own care. Screening tests can find health problems at the earliest stages, when they are easiest to treat. [facility] follows the current, evidence-based guidelines published by the [facility] Suresh Tang ([facility]) when recommending preventive services for our patients. Because we follow these guidelines, sometimes recommendations change over time as research supports it. (For example, a prostate screening blood test is no longer routinely recommended for men with no symptoms). Of course, you and your doctor may decide to screen more often for some diseases, based on your risk and co-morbidities (chronic disease you are already diagnosed with). Preventive services for you include: - Medicare offers their members a free annual wellness visit, which is time for you and your primary care provider to discuss and plan for your preventive service needs. Take advantage of this benefit every year! 
-All adults over age 72 should receive the recommended pneumonia vaccines. Current USPSTF guidelines recommend a series of two vaccines for the best pneumonia protection.  
-All adults should have a flu vaccine yearly and tetanus vaccine every 10 years. 
-All adults age 48 and older should receive the shingles vaccines (series of two vaccines).       
-All adults age 38-68 who are overweight should have a diabetes screening test once every three years.  
-Other screening tests & preventive services for persons with diabetes include: an eye exam to screen for diabetic retinopathy, a kidney function test, a foot exam, and stricter control over your cholesterol.  
-Cardiovascular screening for adults with routine risk involves an electrocardiogram (ECG) at intervals determined by the provider.  
-Colorectal cancer screening should be done for adults age 54-65 with no increased risk factors for colorectal cancer. There are a number of acceptable methods of screening for this type of cancer. Each test has its own benefits and drawbacks. Discuss with your provider what is most appropriate for you during your annual wellness visit. The different tests include: colonoscopy (considered the best screening method), a fecal occult blood test, a fecal DNA test, and sigmoidoscopy. 
-All adults born between [facility] should be screened once for Hepatitis C. 
-An Abdominal Aortic Aneurysm (AAA) Screening is recommended for men age 73-68 who has ever smoked in their lifetime. Here is a list of your current Health Maintenance items (your personalized list of preventive services) with a due date: 
Health Maintenance Due Topic Date Due  
 Annual Well Visit  04/26/2019  Flu Vaccine  08/01/2019  Glaucoma Screening   10/27/2019

## 2019-11-02 DIAGNOSIS — N41.0 ACUTE PROSTATITIS: Primary | ICD-10-CM

## 2019-11-02 RX ORDER — CIPROFLOXACIN 500 MG/1
500 TABLET ORAL 2 TIMES DAILY
Qty: 28 TAB | Refills: 0 | Status: SHIPPED | OUTPATIENT
Start: 2019-11-02 | End: 2019-11-16

## 2019-11-04 NOTE — PROGRESS NOTES
Voicemail left on patient's wife phone, advising that patient hold aricept while taking cipro per Dr. Radha Benito. Informed to contact office with questions or concerns.

## 2019-11-08 ENCOUNTER — OFFICE VISIT (OUTPATIENT)
Dept: NEUROLOGY | Age: 74
End: 2019-11-08

## 2019-11-08 VITALS
HEART RATE: 65 BPM | WEIGHT: 217 LBS | RESPIRATION RATE: 18 BRPM | BODY MASS INDEX: 32.05 KG/M2 | DIASTOLIC BLOOD PRESSURE: 86 MMHG | OXYGEN SATURATION: 98 % | SYSTOLIC BLOOD PRESSURE: 144 MMHG

## 2019-11-08 DIAGNOSIS — F02.80 LATE ONSET ALZHEIMER'S DISEASE WITHOUT BEHAVIORAL DISTURBANCE (HCC): Primary | ICD-10-CM

## 2019-11-08 DIAGNOSIS — G30.1 LATE ONSET ALZHEIMER'S DISEASE WITHOUT BEHAVIORAL DISTURBANCE (HCC): Primary | ICD-10-CM

## 2019-11-08 DIAGNOSIS — Z86.73 HISTORY OF CVA (CEREBROVASCULAR ACCIDENT): ICD-10-CM

## 2019-11-08 RX ORDER — ASPIRIN 325 MG
325 TABLET ORAL DAILY
COMMUNITY

## 2019-11-08 RX ORDER — RIVASTIGMINE TARTRATE 3 MG/1
3 CAPSULE ORAL 2 TIMES DAILY WITH MEALS
Qty: 60 CAP | Refills: 2 | Status: SHIPPED | OUTPATIENT
Start: 2019-11-08 | End: 2020-02-25

## 2019-11-08 NOTE — PROGRESS NOTES
Neurology Clinic Follow up Note    Patient ID:  Hardy Allison  [id number]  19 y.o.  1945      Mr. Dong Osei is here for follow up today of dementia         Last Appointment With Me:  8/15/18      Interval History:   Pt returns for f/u of memory impairment. He states he is more forgetful over the past few months. He has started a prescription for Cipro for recent UTI. He is still riding his motorcycle but typically only with others due to difficulty with navigation. No MVAs. He has been off of Aricept since last visit. States this was too expensive and not particularly effective. He continues to take B12 supplementation. Ability to function:  Driving: yes, rides motorcycles, some difficulty with navigation in the past.    Finances: Wife has taken over paying bills for the most part  Manages own medication: assisted by his wife. Resides: at home with his wife  Fhx dementia: +father      PMHx/ PSHx/ FHx/ SHx:  Reviewed and unchanged previous visit. Past Medical History:   Diagnosis Date    Chronic obstructive pulmonary disease ([facility]     Hypercholesterolemia     Hypertension     Sarcoidosis, lung ([facility] [address] [facility]     [facility]     CVA in distribution RMA---no sequelae    Thyroid disease          ROS:  Comprehensive review of systems negative except for as noted above. Objective:       Meds:  Current Outpatient Medications   Medication Sig Dispense Refill    aspirin (ASPIRIN) 325 mg tablet Take 325 mg by mouth daily.  ciprofloxacin HCl (CIPRO) 500 mg tablet Take 1 Tab by mouth two (2) times a day for 14 days. 28 Tab 0    finasteride (PROSCAR) 5 mg tablet TAKE 1 TABLET BY MOUTH EVERY DAY 90 Tab 3    atorvastatin (LIPITOR) 80 mg tablet TAKE 1 TABLET BY MOUTH EVERY DAY 90 Tab 3    cyanocobalamin (VITAMIN B12) 500 mcg tablet Take 2 Tabs by mouth daily.  60 Tab 5       Exam:  Visit Vitals  /86   Pulse 65   Resp 18   Wt 98.4 kg (217 lb)   SpO2 98%   BMI 32.05 kg/m²     NEUROLOGICAL EXAM:  General: Awake, alert, speech fluent. Oriented to month/date not year. Knows POTUS. CN: PERRL, EOMI without nystagmus, VFF to confrontation, facial sensation and strength are normal and symmetric, hearing is intact to finger rub bilaterally, palate and tongue movements are intact and symmetric. Motor: Normal tone, bulk and strength bilaterally. Reflexes: 1/4 and symmetric, plantar stimulation is flexor. Coordination: FNF, GI, HTS intact. Sensation: LT intact throughout. Gait: Normal-based and steady. LABS  Results for orders placed or performed in visit on 10/30/19   APOLIPOPROTEIN B   Result Value Ref Range    Apolipoprotein B 127 (H) <90 mg/dL   CBC WITH AUTOMATED DIFF   Result Value Ref Range    WBC 6.9 3.4 - 10.8 x10E3/uL    RBC 4.91 4.14 - 5.80 x10E6/uL    HGB 13.1 13.0 - 17.7 g/dL    HCT 40.4 37.5 - 51.0 %    MCV 82 79 - 97 fL    MCH 26.7 26.6 - 33.0 pg    MCHC 32.4 31.5 - 35.7 g/dL    RDW 13.5 12.3 - 15.4 %    PLATELET 009 140 - 759 x10E3/uL    NEUTROPHILS 57 Not Estab. %    Lymphocytes 25 Not Estab. %    MONOCYTES 8 Not Estab. %    EOSINOPHILS 10 Not Estab. %    BASOPHILS 0 Not Estab. %    ABS. NEUTROPHILS 4.0 1.4 - 7.0 x10E3/uL    Abs Lymphocytes 1.7 0.7 - 3.1 x10E3/uL    ABS. MONOCYTES 0.6 0.1 - 0.9 x10E3/uL    ABS. EOSINOPHILS 0.7 (H) 0.0 - 0.4 x10E3/uL    ABS. BASOPHILS 0.0 0.0 - 0.2 x10E3/uL    IMMATURE GRANULOCYTES 0 Not Estab. %    ABS. IMM.  GRANS. 0.0 0.0 - 0.1 x10E3/uL   LIPID PANEL   Result Value Ref Range    Cholesterol, total 216 (H) 100 - 199 mg/dL    Triglyceride 154 (H) 0 - 149 mg/dL    HDL Cholesterol 52 >39 mg/dL    VLDL, calculated 31 5 - 40 mg/dL    LDL, calculated 133 (H) 0 - 99 mg/dL   METABOLIC PANEL, COMPREHENSIVE   Result Value Ref Range    Glucose 94 65 - 99 mg/dL    BUN 9 8 - 27 mg/dL    Creatinine 0.99 0.76 - 1.27 mg/dL    GFR est non-AA 74 >59 mL/min/1.73    GFR est AA 86 >59 mL/min/1.73    BUN/Creatinine ratio 9 (L) 10 - 24    Sodium 141 134 - 144 mmol/L Potassium 4.4 3.5 - 5.2 mmol/L    Chloride 102 96 - 106 mmol/L    CO2 21 20 - 29 mmol/L    Calcium 9.2 8.6 - 10.2 mg/dL    Protein, total 6.6 6.0 - 8.5 g/dL    Albumin 4.1 3.5 - 4.8 g/dL    GLOBULIN, TOTAL 2.5 1.5 - 4.5 g/dL    A-G Ratio 1.6 1.2 - 2.2    Bilirubin, total 0.7 0.0 - 1.2 mg/dL    Alk. phosphatase 83 39 - 117 IU/L    AST (SGOT) 18 0 - 40 IU/L    ALT (SGPT) 20 0 - 44 IU/L   PSA, DIAGNOSTIC (PROSTATE SPECIFIC AG)   Result Value Ref Range    Prostate Specific Ag 1.4 0.0 - 4.0 ng/mL   URINALYSIS W/ RFLX MICROSCOPIC   Result Value Ref Range    Specific Gravity 1.014 1.005 - 1.030    pH (UA) 5.5 5.0 - 7.5    Color Yellow Yellow    Appearance Clear Clear    Leukocyte Esterase 2+ (A) Negative    Protein Negative Negative/Trace    Glucose Negative Negative    Ketone Negative Negative    Blood 1+ (A) Negative    Bilirubin Negative Negative    Urobilinogen 0.2 0.2 - 1.0 mg/dL    Nitrites Positive (A) Negative    Microscopic Examination See additional order    MICROSCOPIC EXAMINATION   Result Value Ref Range    WBC 6-10 (A) 0 - 5 /hpf    RBC 3-10 (A) 0 - 2 /hpf    Epithelial cells 0-10 0 - 10 /hpf    Casts None seen None seen /lpf    Mucus Present Not Estab. Bacteria Few None seen/Few       IMAGING:  MRI Results (most recent):  Results from Hospital Encounter encounter on 05/10/17   MRI BRAIN WO CONT    Narrative EXAM:  MRI BRAIN WO CONT  INDICATION:  progressive dementia, M03.90,  TECHNIQUE: Sagittal FLAIR and T1, axial FLAIR, T2,T1 and gradient echo images as  well as coronal T2 weighted images and axial diffusion weighted images of the  head were obtained. COMPARISON:  CT head 2/22/12  FINDINGS:  Mild generalized sulcal and ventricular prominence is consistent with cerebral  volume loss and within the range of normal for age. Multiple foci of T2 hyperintensity in the cerebral white matter with mild  increased since 2011 suggests chronic small vessel ischemic change.   The left frakn acute infarct demonstrated 7/6/11 now has cystic change. No evidence of hemorrhage, mass, acute infarction or abnormal extra-axial fluid  collections. Flow voids are present in the vertebral basilar and carotid artery systems. The craniocervical junction is unremarkable. The structures at the cranial base including paranasal sinuses are unremarkable. Impression IMPRESSION:   1. Findings consistent with mild chronic small vessel type ischemic white matter  disease. 2. Old left frank small lacunar infarct. 3. No acute intracranial abnormality demonstrated. Assessment:     Encounter Diagnoses     ICD-10-CM ICD-9-CM   1. Late onset Alzheimer's disease without behavioral disturbance (HCC) G30.1 331.0    F02.80 294.10   2. History of CVA (cerebrovascular accident) Z80.65 V14.48      74year old AAM presenting for f/u of progressive dementia. MOCA 15/30 with significantly impaired visuospatial/executive functioning, attention, delayed recall. DDx: Mixed AD and vascular dementia. MRI Brain independently reviewed with patient revealing evidence of remote L pontine and L lacunar infarction. He is maintained on antiplatelet and statin therapy for stroke prevention. Discussed need for assistance moving forward when making executive decisions, assistance with medications (using a pill box), assistance with navigation and avoidance of traveling alone. Discussed treatment options in detail including risks/benefits and expectations. While medication is not likely to made a \"night and day\" difference in AD, it may aid modestly in improving concentration and attention. Medication titration and addition of adjunctive agents may be necessary to achieve maximum benefit. He did not tolerate Namzaric due to side effects (headache). He self weaned off of Aricept due to ineffectiveness and cost.  We discussed alternative treatment options today including rivastigmine and potential side effects. He elects to proceed.   We also discussed formal OT driving evaluation due to ongoing issues with navigation. I advise that he limit driving to short distances during the day time presently. Plan:   Trial of Exelon 3mg BID  Cont. B12 supplementation 1000mcg daily  Cont. ASA, lipitor for stroke prevention  Goal LDL<70, SBP<140  Heart healthy diet and exercise  Regular scheduled cognitive and social engagement. Follow-up and Dispositions    · Return in about 3 months (around 2/8/2020).              Signed:  Colleen Tiwari,   11/8/2019

## 2019-11-08 NOTE — PATIENT INSTRUCTIONS
PRESCRIPTION REFILL POLICY [facility] Statement to Patients April 1, 2014 In an effort to ensure the large volume of patient prescription refills is processed in the most efficient and expeditious manner, we are asking our patients to assist us by calling your Pharmacy for all prescription refills, this will include also your  Mail Order Pharmacy. The pharmacy will contact our office electronically to continue the refill process. Please do not wait until the last minute to call your pharmacy. We need at least 48 hours (2days) to fill prescriptions. We also encourage you to call your pharmacy before going to  your prescription to make sure it is ready. With regard to controlled substance prescription refill requests (narcotic refills) that need to be picked up at our office, we ask your cooperation by providing us with at least 72 hours (3days) notice that you will need a refill. We will not refill narcotic prescription refill requests after 4:00pm on any weekday, Monday through Thursday, or after 2:00pm on Fridays, or on the weekends. We encourage everyone to explore another way of getting your prescription refill request processed using [employer], our patient web portal through our electronic medical record system. [employer] is an efficient and effective way to communicate your medication request directly to the office and  downloadable as an solo on your smart phone . [employer] also features a review functionality that allows you to view your medication list as well as leave messages for your physician. Are you ready to get connected? If so please review the attatched instructions or speak to any of our staff to get you set up right away! Thank you so much for your cooperation. Should you have any questions please contact our Practice Administrator. The Physicians and Staff,  [facility]

## 2020-03-11 ENCOUNTER — OFFICE VISIT (OUTPATIENT)
Dept: NEUROLOGY | Age: 75
End: 2020-03-11

## 2020-03-11 VITALS
DIASTOLIC BLOOD PRESSURE: 84 MMHG | OXYGEN SATURATION: 99 % | RESPIRATION RATE: 18 BRPM | HEART RATE: 64 BPM | BODY MASS INDEX: 32.78 KG/M2 | WEIGHT: 222 LBS | SYSTOLIC BLOOD PRESSURE: 114 MMHG

## 2020-03-11 DIAGNOSIS — F02.80 LATE ONSET ALZHEIMER'S DISEASE WITHOUT BEHAVIORAL DISTURBANCE (HCC): Primary | ICD-10-CM

## 2020-03-11 DIAGNOSIS — E78.5 DYSLIPIDEMIA: ICD-10-CM

## 2020-03-11 DIAGNOSIS — Z86.73 HISTORY OF CVA (CEREBROVASCULAR ACCIDENT): ICD-10-CM

## 2020-03-11 DIAGNOSIS — G30.1 LATE ONSET ALZHEIMER'S DISEASE WITHOUT BEHAVIORAL DISTURBANCE (HCC): Primary | ICD-10-CM

## 2020-03-11 RX ORDER — RIVASTIGMINE TARTRATE 3 MG/1
CAPSULE ORAL
Qty: 180 CAP | Refills: 1 | Status: SHIPPED | OUTPATIENT
Start: 2020-03-11 | End: 2020-11-18 | Stop reason: SDUPTHER

## 2020-03-11 RX ORDER — LOSARTAN POTASSIUM 50 MG/1
TABLET ORAL DAILY
COMMUNITY
End: 2020-04-27 | Stop reason: SDUPTHER

## 2020-03-11 NOTE — PROGRESS NOTES
Neurology Clinic Follow up Note    Patient ID:  Keily Leach  [id number]  27 y.o.  1945      Mr. Cami Kendrick is here for follow up today of dementia         Last Appointment With Me:  11/8/19      Interval History:   Pt returns for f/u of memory impairment. He states memory is stable from last visit. Family agrees. He is still riding his motorcycle- previously with difficulty navigating but this has improved on Exelon. He is tolerating medication well. He continues to take B12 supplementation. Ability to function:  Driving: yes, rides motorcycles, some difficulty with navigation in the past.    Finances: Wife has taken over paying bills for the most part  Manages own medication: assisted by his wife. Resides: at home with his wife  Fhx dementia: +father      PMHx/ PSHx/ FHx/ SHx:  Reviewed and unchanged previous visit. Past Medical History:   Diagnosis Date    [facility]     Hypercholesterolemia     Hypertension     Sarcoidosis, lung ([facility] [facility]     Stroke ([facility] [address] [facility]     CVA in distribution RMA---no sequelae    Thyroid disease          ROS:  Comprehensive review of systems negative except for as noted above. Objective:       Meds:  Current Outpatient Medications   Medication Sig Dispense Refill    losartan (COZAAR) 50 mg tablet Take  by mouth daily.  rivastigmine tartrate (EXELON) 3 mg capsule TAKE 1 CAPSULE BY MOUTH TWICE A DAY WITH MEALS 60 Cap 0    aspirin (ASPIRIN) 325 mg tablet Take 325 mg by mouth daily.  finasteride (PROSCAR) 5 mg tablet TAKE 1 TABLET BY MOUTH EVERY DAY 90 Tab 3    atorvastatin (LIPITOR) 80 mg tablet TAKE 1 TABLET BY MOUTH EVERY DAY 90 Tab 3    cyanocobalamin (VITAMIN B12) 500 mcg tablet Take 2 Tabs by mouth daily. 60 Tab 5         Exam:  Visit Vitals  /84   Pulse 64   Resp 18   Wt 100.7 kg (222 lb)   SpO2 99%   BMI 32.78 kg/m²     NEUROLOGICAL EXAM:  General: Awake, alert, speech fluent. Oriented to month/date not year. Winsome BUTLER. CN: PERRL, EOMI without nystagmus, VFF to confrontation, facial sensation and strength are normal and symmetric, hearing is intact to finger rub bilaterally, palate and tongue movements are intact and symmetric. Motor: Normal tone, bulk and strength bilaterally. Reflexes: 1/4 and symmetric, plantar stimulation is flexor. Coordination: FNF, GI, HTS intact. Sensation: LT intact throughout. Gait: Normal-based and steady. LABS  Results for orders placed or performed in visit on 10/30/19   APOLIPOPROTEIN B   Result Value Ref Range    Apolipoprotein B 127 (H) <90 mg/dL   CBC WITH AUTOMATED DIFF   Result Value Ref Range    WBC 6.9 3.4 - 10.8 x10E3/uL    RBC 4.91 4.14 - 5.80 x10E6/uL    HGB 13.1 13.0 - 17.7 g/dL    HCT 40.4 37.5 - 51.0 %    MCV 82 79 - 97 fL    MCH 26.7 26.6 - 33.0 pg    MCHC 32.4 31.5 - 35.7 g/dL    RDW 13.5 12.3 - 15.4 %    PLATELET 956 562 - 346 x10E3/uL    NEUTROPHILS 57 Not Estab. %    Lymphocytes 25 Not Estab. %    MONOCYTES 8 Not Estab. %    EOSINOPHILS 10 Not Estab. %    BASOPHILS 0 Not Estab. %    ABS. NEUTROPHILS 4.0 1.4 - 7.0 x10E3/uL    Abs Lymphocytes 1.7 0.7 - 3.1 x10E3/uL    ABS. MONOCYTES 0.6 0.1 - 0.9 x10E3/uL    ABS. EOSINOPHILS 0.7 (H) 0.0 - 0.4 x10E3/uL    ABS. BASOPHILS 0.0 0.0 - 0.2 x10E3/uL    IMMATURE GRANULOCYTES 0 Not Estab. %    ABS. IMM.  GRANS. 0.0 0.0 - 0.1 x10E3/uL   LIPID PANEL   Result Value Ref Range    Cholesterol, total 216 (H) 100 - 199 mg/dL    Triglyceride 154 (H) 0 - 149 mg/dL    HDL Cholesterol 52 >39 mg/dL    VLDL, calculated 31 5 - 40 mg/dL    LDL, calculated 133 (H) 0 - 99 mg/dL   METABOLIC PANEL, COMPREHENSIVE   Result Value Ref Range    Glucose 94 65 - 99 mg/dL    BUN 9 8 - 27 mg/dL    Creatinine 0.99 0.76 - 1.27 mg/dL    GFR est non-AA 74 >59 mL/min/1.73    GFR est AA 86 >59 mL/min/1.73    BUN/Creatinine ratio 9 (L) 10 - 24    Sodium 141 134 - 144 mmol/L    Potassium 4.4 3.5 - 5.2 mmol/L    Chloride 102 96 - 106 mmol/L    CO2 21 20 - 29 mmol/L    Calcium 9.2 8.6 - 10.2 mg/dL    Protein, total 6.6 6.0 - 8.5 g/dL    Albumin 4.1 3.5 - 4.8 g/dL    GLOBULIN, TOTAL 2.5 1.5 - 4.5 g/dL    A-G Ratio 1.6 1.2 - 2.2    Bilirubin, total 0.7 0.0 - 1.2 mg/dL    Alk. phosphatase 83 39 - 117 IU/L    AST (SGOT) 18 0 - 40 IU/L    ALT (SGPT) 20 0 - 44 IU/L   PSA, DIAGNOSTIC (PROSTATE SPECIFIC AG)   Result Value Ref Range    Prostate Specific Ag 1.4 0.0 - 4.0 ng/mL   URINALYSIS W/ RFLX MICROSCOPIC   Result Value Ref Range    Specific Gravity 1.014 1.005 - 1.030    pH (UA) 5.5 5.0 - 7.5    Color Yellow Yellow    Appearance Clear Clear    Leukocyte Esterase 2+ (A) Negative    Protein Negative Negative/Trace    Glucose Negative Negative    Ketone Negative Negative    Blood 1+ (A) Negative    Bilirubin Negative Negative    Urobilinogen 0.2 0.2 - 1.0 mg/dL    Nitrites Positive (A) Negative    Microscopic Examination See additional order    MICROSCOPIC EXAMINATION   Result Value Ref Range    WBC 6-10 (A) 0 - 5 /hpf    RBC 3-10 (A) 0 - 2 /hpf    Epithelial cells 0-10 0 - 10 /hpf    Casts None seen None seen /lpf    Mucus Present Not Estab. Bacteria Few None seen/Few       IMAGING:  MRI Results (most recent):  Results from Hospital Encounter encounter on 05/10/17   MRI BRAIN WO CONT    Narrative EXAM:  MRI BRAIN WO CONT  INDICATION:  progressive dementia, M03.90,  TECHNIQUE: Sagittal FLAIR and T1, axial FLAIR, T2,T1 and gradient echo images as  well as coronal T2 weighted images and axial diffusion weighted images of the  head were obtained. COMPARISON:  CT head 2/22/12  FINDINGS:  Mild generalized sulcal and ventricular prominence is consistent with cerebral  volume loss and within the range of normal for age. Multiple foci of T2 hyperintensity in the cerebral white matter with mild  increased since 2011 suggests chronic small vessel ischemic change. The left frank acute infarct demonstrated 7/6/11 now has cystic change.   No evidence of hemorrhage, mass, acute infarction or abnormal extra-axial fluid  collections. Flow voids are present in the vertebral basilar and carotid artery systems. The craniocervical junction is unremarkable. The structures at the cranial base including paranasal sinuses are unremarkable. Impression IMPRESSION:   1. Findings consistent with mild chronic small vessel type ischemic white matter  disease. 2. Old left frank small lacunar infarct. 3. No acute intracranial abnormality demonstrated. Assessment:     Encounter Diagnoses     ICD-10-CM ICD-9-CM   1. Late onset Alzheimer's disease without behavioral disturbance (HCC) G30.1 331.0    F02.80 294.10   2. History of CVA (cerebrovascular accident) Z86.73 V12.54   3. Dyslipidemia E78.5 32.4   66year old AAM presenting for f/u of progressive dementia. MOCA 15/30 with significantly impaired visuospatial/executive functioning, attention, delayed recall. DDx: Mixed AD and vascular dementia. MRI Brain independently reviewed with patient revealing evidence of remote L pontine and L lacunar infarction. He is maintained on antiplatelet and statin therapy for stroke prevention. Discussed need for assistance moving forward when making executive decisions, assistance with medications (using a pill box), assistance with navigation and avoidance of traveling alone. Discussed treatment options in detail including risks/benefits and expectations. While medication is not likely to made a \"night and day\" difference in AD, it may aid modestly in improving concentration and attention. Medication titration and addition of adjunctive agents may be necessary to achieve maximum benefit. He did not tolerate Namzaric due to side effects (headache). He self weaned off of Aricept due to ineffectiveness and cost.  We discussed alternative treatment options at last visit including rivastigmine and potential side effects. He has noted some benefit after initiation of Exelon.     I advise that he limit driving to short distances during the day time presently. Plan:   Continue Exelon 3mg BID  Cont. B12 supplementation 1000mcg daily  Cont. ASA, lipitor for stroke prevention  Goal LDL<70, SBP<140  Heart healthy diet and exercise  Regular scheduled cognitive and social engagement. Follow-up and Dispositions    · Return in about 6 months (around 9/11/2020).            Signed:  Fadi Jaramillo,   3/11/2020

## 2020-03-11 NOTE — PATIENT INSTRUCTIONS
PRESCRIPTION REFILL POLICY [facility] Statement to Patients April 1, 2014 In an effort to ensure the large volume of patient prescription refills is processed in the most efficient and expeditious manner, we are asking our patients to assist us by calling your Pharmacy for all prescription refills, this will include also your  Mail Order Pharmacy. The pharmacy will contact our office electronically to continue the refill process. Please do not wait until the last minute to call your pharmacy. We need at least 48 hours (2days) to fill prescriptions. We also encourage you to call your pharmacy before going to  your prescription to make sure it is ready. With regard to controlled substance prescription refill requests (narcotic refills) that need to be picked up at our office, we ask your cooperation by providing us with at least 72 hours (3days) notice that you will need a refill. We will not refill narcotic prescription refill requests after 4:00pm on any weekday, Monday through Thursday, or after 2:00pm on Fridays, or on the weekends. We encourage everyone to explore another way of getting your prescription refill request processed using [employer], our patient web portal through our electronic medical record system. [employer] is an efficient and effective way to communicate your medication request directly to the office and  downloadable as an solo on your smart phone . [employer] also features a review functionality that allows you to view your medication list as well as leave messages for your physician. Are you ready to get connected? If so please review the attatched instructions or speak to any of our staff to get you set up right away! Thank you so much for your cooperation. Should you have any questions please contact our Practice Administrator. The Physicians and Staff,  [facility]

## 2020-04-27 RX ORDER — ATORVASTATIN CALCIUM 80 MG/1
TABLET, FILM COATED ORAL
Qty: 90 TAB | Refills: 3 | Status: SHIPPED | OUTPATIENT
Start: 2020-04-27 | End: 2021-05-03

## 2020-04-27 RX ORDER — LOSARTAN POTASSIUM 50 MG/1
50 TABLET ORAL DAILY
Qty: 90 TAB | Refills: 3 | Status: SHIPPED | OUTPATIENT
Start: 2020-04-27 | End: 2021-08-28

## 2020-04-27 RX ORDER — FINASTERIDE 5 MG/1
TABLET, FILM COATED ORAL
Qty: 90 TAB | Refills: 3 | Status: SHIPPED | OUTPATIENT
Start: 2020-04-27 | End: 2021-05-03

## 2020-04-29 ENCOUNTER — VIRTUAL VISIT (OUTPATIENT)
Dept: INTERNAL MEDICINE CLINIC | Age: 75
End: 2020-04-29

## 2020-04-29 DIAGNOSIS — N40.0 PROSTATISM: ICD-10-CM

## 2020-04-29 DIAGNOSIS — G30.1 LATE ONSET ALZHEIMER'S DISEASE WITHOUT BEHAVIORAL DISTURBANCE (HCC): Primary | ICD-10-CM

## 2020-04-29 DIAGNOSIS — I10 ESSENTIAL HYPERTENSION: ICD-10-CM

## 2020-04-29 DIAGNOSIS — J43.1 PANLOBULAR EMPHYSEMA (HCC): ICD-10-CM

## 2020-04-29 DIAGNOSIS — Z86.73 HISTORY OF CVA (CEREBROVASCULAR ACCIDENT): ICD-10-CM

## 2020-04-29 DIAGNOSIS — E78.5 DYSLIPIDEMIA: ICD-10-CM

## 2020-04-29 DIAGNOSIS — F02.80 LATE ONSET ALZHEIMER'S DISEASE WITHOUT BEHAVIORAL DISTURBANCE (HCC): Primary | ICD-10-CM

## 2020-04-29 NOTE — PROGRESS NOTES
Chief Complaint   Patient presents with    Hypertension     6 month follow up     1. Have you been to the ER, urgent care clinic since your last visit? Hospitalized since your last visit? No    2. Have you seen or consulted any other health care providers outside of the [address] [place] [address] since your last visit? Include any pap smears or colon screening.  No

## 2020-04-30 NOTE — PROGRESS NOTES
Miki Gurrola is a 66 y.o. male who was seen by synchronous (real-time) audio-video technology on 4/29/2020. Consent: Miki Gurrola, who was seen by synchronous (real-time) audio-video technology, and/or his healthcare decision maker, is aware that this patient-initiated, Telehealth encounter on 4/29/2020 is a billable service, with coverage as determined by his insurance carrier. He is aware that he may receive a bill and has provided verbal consent to proceed: Yes. Assessment & Plan:   Diagnoses and all orders for this visit:    1. Late onset Alzheimer's disease without behavioral disturbance ([facility]    2. Essential hypertension    3. Prostatism    4. Panlobular emphysema ([facility]    5. Dyslipidemia    6. History of CVA (cerebrovascular accident)    1. The patient's Alzheimer's appears to be reasonably stable. He answers all my questions appropriately and is quite affable at this point. 2.  He has been taking his antihypertensive medication as prescribed. Is important because he does have a history of cardiovascular disease. 3.  He also remains on a statin because of his cardiovascular disease as well as his significantly elevated cardiovascular risk. 4.  He continues with his prostatism and remains on his finasteride. He periodically follows up with a urologist.  5.  He has no major sequelae from his previous CVA of well over 15 years ago. 6.  Finally he needs to discontinue his cigarette smoking which I have suggested for years. He is really not interested in stopping. Even when his cognition was reasonable he had no interest in discontinuing cigarette smoking. I spent at least 23 minutes on this visit with this established patient. ([id number])    Subjective:   Miki Gurrola is a 66 y.o. male who was seen for Hypertension (6 month follow up)  States that he is doing reasonably well. His wife also makes the same comment.   He answers all my questions appropriately and he has a reasonable and appropriate affect. Unfortunately continues to smoke cigarettes. He does have existing COPD. He has a past history of primary hypertension ,dyslipidemia, and prostatism. Prior to Admission medications    Medication Sig Start Date End Date Taking? Authorizing Provider   finasteride (PROSCAR) 5 mg tablet TAKE 1 TABLET BY MOUTH EVERY DAY 4/27/20  Yes Moy Victor MD   atorvastatin (LIPITOR) 80 mg tablet 1 tablet daily 4/27/20  Yes Moy Victor MD   losartan (COZAAR) 50 mg tablet Take 1 Tab by mouth daily. 4/27/20  Yes Moy Victor MD   rivastigmine tartrate (EXELON) 3 mg capsule TAKE 1 CAPSULE BY MOUTH TWICE A DAY WITH MEALS 3/11/20  Yes Carlee Heard,    aspirin (ASPIRIN) 325 mg tablet Take 325 mg by mouth daily. Yes Provider, Historical   cyanocobalamin (VITAMIN B12) 500 mcg tablet Take 2 Tabs by mouth daily. 7/13/17  Yes Carlee Heard DO     No Known Allergies    Current Outpatient Medications   Medication Sig Dispense Refill    finasteride (PROSCAR) 5 mg tablet TAKE 1 TABLET BY MOUTH EVERY DAY 90 Tab 3    atorvastatin (LIPITOR) 80 mg tablet 1 tablet daily 90 Tab 3    losartan (COZAAR) 50 mg tablet Take 1 Tab by mouth daily. 90 Tab 3    rivastigmine tartrate (EXELON) 3 mg capsule TAKE 1 CAPSULE BY MOUTH TWICE A DAY WITH MEALS 180 Cap 1    aspirin (ASPIRIN) 325 mg tablet Take 325 mg by mouth daily.  cyanocobalamin (VITAMIN B12) 500 mcg tablet Take 2 Tabs by mouth daily. 61 Tab 5     No Known Allergies  Past Medical History:   Diagnosis Date    Chronic obstructive pulmonary disease ([address]     Hypercholesterolemia     Hypertension     [facility]     Stroke ([facility]     CVA in distribution RMA---no sequelae    Thyroid disease      Past Surgical History:   Procedure Laterality Date    HX COLONOSCOPY       Family History   Problem Relation Age of Onset    Diabetes Mother     Diabetes Brother        ROS:14 point ROS neg.     Objective:   Vital Signs: (As obtained by patient/caregiver at home)  There were no vitals taken for this visit. [INSTRUCTIONS:  \"[x]\" Indicates a positive item  \"[]\" Indicates a negative item  -- DELETE ALL ITEMS NOT EXAMINED]    Constitutional: [x] Appears well-developed and well-nourished [x] No apparent distress      [] Abnormal -     Mental status: [x] Alert and awake  [x] Oriented to person/place/time [x] Able to follow commands    [] Abnormal -     Eyes:   EOM    [x]  Normal    [] Abnormal -   Sclera  [x]  Normal    [] Abnormal -          Discharge [x]  None visible   [] Abnormal -     HENT: [x] Normocephalic, atraumatic  [] Abnormal -   [x] Mouth/Throat: Mucous membranes are moist    External Ears [x] Normal  [] Abnormal -    Neck: [x] No visualized mass [] Abnormal -     Pulmonary/Chest: [x] Respiratory effort normal   [x] No visualized signs of difficulty breathing or respiratory distress        [] Abnormal -      Musculoskeletal:   [x] Normal gait with no signs of ataxia         [x] Normal range of motion of neck        [] Abnormal -     Neurological:        [x] No Facial Asymmetry (Cranial nerve 7 motor function) (limited exam due to video visit)          [x] No gaze palsy        [] Abnormal -          Skin:        [x] No significant exanthematous lesions or discoloration noted on facial skin         [] Abnormal -            Psychiatric:       [x] Normal Affect [] Abnormal -        [x] No Hallucinations    Other pertinent observable physical exam findings:-        We discussed the expected course, resolution and complications of the diagnosis(es) in detail. Medication risks, benefits, costs, interactions, and alternatives were discussed as indicated. I advised him to contact the office if his condition worsens, changes or fails to improve as anticipated. He expressed understanding with the diagnosis(es) and plan. Bear Lopez is a 66 y.o. male who was evaluated by a video visit encounter for concerns as above.  Patient identification was verified prior to start of the visit. A caregiver was present when appropriate. Due to this being a TeleHealth encounter (During [facility] public health emergency), evaluation of the following organ systems was limited: Vitals/Constitutional/EENT/Resp/CV/GI//MS/Neuro/Skin/Heme-Lymph-Imm. Pursuant to the emergency declaration under the [facility], [facility] waiver authority and the [employer] Act, this Virtual  Visit was conducted, with patient's (and/or legal guardian's) consent, to reduce the patient's risk of exposure to COVID-19 and provide necessary medical care. Services were provided through a video synchronous discussion virtually to substitute for in-person clinic visit. Patient and provider were located at their individual homes. Please note that this dictation was completed with [employer], the [employer] voice recognition software. Quite often unanticipated grammatical, syntax, homophones, and other interpretive errors are inadvertently transcribed by the computer software. Please disregard these errors. Please excuse any errors that have escaped final proofreading. Thank you.   Lilliana Chaney MD

## 2020-05-13 ENCOUNTER — TELEPHONE (OUTPATIENT)
Dept: NEUROLOGY | Age: 75
End: 2020-05-13

## 2020-05-13 NOTE — TELEPHONE ENCOUNTER
Family stated patient needed refill on Exelon, did not  Rx in March. Requesting medication be called in. Medication called in.

## 2020-05-13 NOTE — TELEPHONE ENCOUNTER
----- Message from Janak Sandoval sent at 5/13/2020 12:55 PM EDT -----  Regarding: Dr. Martin Boudreaux (if not patient): Sven Arias      Relationship of caller (if not patient):wife      Best contact number(s): [phone]      Name of medication and dosage if known: Rivastigmine 3 mg       Is patient out of this medication (yes/no):  No, 4 days left      Pharmacy name: [facility]    Pharmacy listed in chart? (yes/no):   Pharmacy phone number: [phone]      Details to clarify the request:      Janak Sandoval

## 2020-11-18 ENCOUNTER — OFFICE VISIT (OUTPATIENT)
Dept: NEUROLOGY | Age: 75
End: 2020-11-18
Payer: MEDICARE

## 2020-11-18 VITALS
BODY MASS INDEX: 32.78 KG/M2 | DIASTOLIC BLOOD PRESSURE: 62 MMHG | WEIGHT: 222 LBS | SYSTOLIC BLOOD PRESSURE: 124 MMHG | RESPIRATION RATE: 18 BRPM | OXYGEN SATURATION: 96 % | HEART RATE: 81 BPM

## 2020-11-18 DIAGNOSIS — F02.80 LATE ONSET ALZHEIMER'S DISEASE WITHOUT BEHAVIORAL DISTURBANCE (HCC): Primary | ICD-10-CM

## 2020-11-18 DIAGNOSIS — Z86.73 HISTORY OF CVA (CEREBROVASCULAR ACCIDENT): ICD-10-CM

## 2020-11-18 DIAGNOSIS — G30.1 LATE ONSET ALZHEIMER'S DISEASE WITHOUT BEHAVIORAL DISTURBANCE (HCC): Primary | ICD-10-CM

## 2020-11-18 PROCEDURE — G8754 DIAS BP LESS 90: HCPCS | Performed by: PSYCHIATRY & NEUROLOGY

## 2020-11-18 PROCEDURE — 1101F PT FALLS ASSESS-DOCD LE1/YR: CPT | Performed by: PSYCHIATRY & NEUROLOGY

## 2020-11-18 PROCEDURE — G8432 DEP SCR NOT DOC, RNG: HCPCS | Performed by: PSYCHIATRY & NEUROLOGY

## 2020-11-18 PROCEDURE — G8417 CALC BMI ABV UP PARAM F/U: HCPCS | Performed by: PSYCHIATRY & NEUROLOGY

## 2020-11-18 PROCEDURE — G8427 DOCREV CUR MEDS BY ELIG CLIN: HCPCS | Performed by: PSYCHIATRY & NEUROLOGY

## 2020-11-18 PROCEDURE — 99214 OFFICE O/P EST MOD 30 MIN: CPT | Performed by: PSYCHIATRY & NEUROLOGY

## 2020-11-18 PROCEDURE — G8536 NO DOC ELDER MAL SCRN: HCPCS | Performed by: PSYCHIATRY & NEUROLOGY

## 2020-11-18 PROCEDURE — G8752 SYS BP LESS 140: HCPCS | Performed by: PSYCHIATRY & NEUROLOGY

## 2020-11-18 RX ORDER — RIVASTIGMINE TARTRATE 3 MG/1
CAPSULE ORAL
Qty: 180 CAP | Refills: 1 | Status: SHIPPED | OUTPATIENT
Start: 2020-11-18

## 2020-11-18 NOTE — PATIENT INSTRUCTIONS
PRESCRIPTION REFILL POLICY [facility] Statement to Patients April 1, 2014 In an effort to ensure the large volume of patient prescription refills is processed in the most efficient and expeditious manner, we are asking our patients to assist us by calling your Pharmacy for all prescription refills, this will include also your  Mail Order Pharmacy. The pharmacy will contact our office electronically to continue the refill process. Please do not wait until the last minute to call your pharmacy. We need at least 48 hours (2days) to fill prescriptions. We also encourage you to call your pharmacy before going to  your prescription to make sure it is ready. With regard to controlled substance prescription refill requests (narcotic refills) that need to be picked up at our office, we ask your cooperation by providing us with at least 72 hours (3days) notice that you will need a refill. We will not refill narcotic prescription refill requests after 4:00pm on any weekday, Monday through Thursday, or after 2:00pm on Fridays, or on the weekends. We encourage everyone to explore another way of getting your prescription refill request processed using Smart[employer], our patient web portal through our electronic medical record system. Smartvue is an efficient and effective way to communicate your medication request directly to the office and  downloadable as an solo on your smart phone . Smartvue also features a review functionality that allows you to view your medication list as well as leave messages for your physician. Are you ready to get connected? If so please review the attatched instructions or speak to any of our staff to get you set up right away! Thank you so much for your cooperation. Should you have any questions please contact our Practice Administrator. The Physicians and Staff,  [facility]

## 2020-11-18 NOTE — PROGRESS NOTES
Neurology Clinic Follow up Note    Patient ID:  Mica Cobian  [id number]  66 y.o.  1945      Mr. Caleb Watts is here for follow up today of dementia         Last Appointment With Me:  9/9/2020      Interval History:   Pt returns for f/u of memory impairment. He states memory is stable from last visit. He is still riding his motorcycle but only in groups and avoiding longer distances due to previous difficulty with navigation. He remains on Exelon and appears to be tolerating medication well. He continues to take B12 supplementation. No reported falls. He is eating well-weight is stable. Ability to function:  Driving: yes, rides motorcycles, some difficulty with navigation in the past.    Finances: Wife has taken over paying bills for the most part  Manages own medication: previously assisted by his wife, although he tells me he is able to manage his medications independently for the most part today  Resides: at home with his wife  Fhx dementia: +father      PMHx/ PSHx/ FHx/ SHx:  Reviewed and unchanged previous visit. Past Medical History:   Diagnosis Date    [facility]     Hypercholesterolemia     Hypertension     Sarcoidosis, lung ([facility]     Stroke ([facility]     CVA in distribution RMA---no sequelae    Thyroid disease          ROS:  Comprehensive review of systems negative except for as noted above. Objective:       Meds:  Current Outpatient Medications   Medication Sig Dispense Refill    finasteride (PROSCAR) 5 mg tablet TAKE 1 TABLET BY MOUTH EVERY DAY 90 Tab 3    atorvastatin (LIPITOR) 80 mg tablet 1 tablet daily 90 Tab 3    losartan (COZAAR) 50 mg tablet Take 1 Tab by mouth daily. 90 Tab 3    rivastigmine tartrate (EXELON) 3 mg capsule TAKE 1 CAPSULE BY MOUTH TWICE A DAY WITH MEALS 180 Cap 1    aspirin (ASPIRIN) 325 mg tablet Take 325 mg by mouth daily.  cyanocobalamin (VITAMIN B12) 500 mcg tablet Take 2 Tabs by mouth daily.  60 Tab 5 Exam:  Visit Vitals  /62   Pulse 81   Resp 18   Wt 100.7 kg (222 lb)   SpO2 96%   BMI 32.78 kg/m²     NEUROLOGICAL EXAM:  General: Awake, alert, speech fluent. MOCA 12/30 (see scanned media)  CN: PERRL, EOMI without nystagmus, VFF to confrontation, facial sensation and strength are normal and symmetric, hearing is intact to finger rub bilaterally, palate and tongue movements are intact and symmetric. Motor: Normal tone, bulk and strength bilaterally. Reflexes: 1/4 and symmetric, plantar stimulation is flexor. Coordination: FNF, GI, HTS intact. Sensation: LT intact throughout. Gait: Normal-based and steady. Uses cane intermittently    LABS  Results for orders placed or performed in visit on 10/30/19   APOLIPOPROTEIN B   Result Value Ref Range    Apolipoprotein B 127 (H) <90 mg/dL   CBC WITH AUTOMATED DIFF   Result Value Ref Range    WBC 6.9 3.4 - 10.8 x10E3/uL    RBC 4.91 4.14 - 5.80 x10E6/uL    HGB 13.1 13.0 - 17.7 g/dL    HCT 40.4 37.5 - 51.0 %    MCV 82 79 - 97 fL    MCH 26.7 26.6 - 33.0 pg    MCHC 32.4 31.5 - 35.7 g/dL    RDW 13.5 12.3 - 15.4 %    PLATELET 114 360 - 583 x10E3/uL    NEUTROPHILS 57 Not Estab. %    Lymphocytes 25 Not Estab. %    MONOCYTES 8 Not Estab. %    EOSINOPHILS 10 Not Estab. %    BASOPHILS 0 Not Estab. %    ABS. NEUTROPHILS 4.0 1.4 - 7.0 x10E3/uL    Abs Lymphocytes 1.7 0.7 - 3.1 x10E3/uL    ABS. MONOCYTES 0.6 0.1 - 0.9 x10E3/uL    ABS. EOSINOPHILS 0.7 (H) 0.0 - 0.4 x10E3/uL    ABS. BASOPHILS 0.0 0.0 - 0.2 x10E3/uL    IMMATURE GRANULOCYTES 0 Not Estab. %    ABS. IMM.  GRANS. 0.0 0.0 - 0.1 x10E3/uL   LIPID PANEL   Result Value Ref Range    Cholesterol, total 216 (H) 100 - 199 mg/dL    Triglyceride 154 (H) 0 - 149 mg/dL    HDL Cholesterol 52 >39 mg/dL    VLDL, calculated 31 5 - 40 mg/dL    LDL, calculated 133 (H) 0 - 99 mg/dL   METABOLIC PANEL, COMPREHENSIVE   Result Value Ref Range    Glucose 94 65 - 99 mg/dL    BUN 9 8 - 27 mg/dL    Creatinine 0.99 0.76 - 1.27 mg/dL    GFR est non-AA 74 >59 mL/min/1.73    GFR est AA 86 >59 mL/min/1.73    BUN/Creatinine ratio 9 (L) 10 - 24    Sodium 141 134 - 144 mmol/L    Potassium 4.4 3.5 - 5.2 mmol/L    Chloride 102 96 - 106 mmol/L    CO2 21 20 - 29 mmol/L    Calcium 9.2 8.6 - 10.2 mg/dL    Protein, total 6.6 6.0 - 8.5 g/dL    Albumin 4.1 3.5 - 4.8 g/dL    GLOBULIN, TOTAL 2.5 1.5 - 4.5 g/dL    A-G Ratio 1.6 1.2 - 2.2    Bilirubin, total 0.7 0.0 - 1.2 mg/dL    Alk. phosphatase 83 39 - 117 IU/L    AST (SGOT) 18 0 - 40 IU/L    ALT (SGPT) 20 0 - 44 IU/L   PSA, DIAGNOSTIC (PROSTATE SPECIFIC AG)   Result Value Ref Range    Prostate Specific Ag 1.4 0.0 - 4.0 ng/mL   URINALYSIS W/ RFLX MICROSCOPIC   Result Value Ref Range    Specific Gravity 1.014 1.005 - 1.030    pH (UA) 5.5 5.0 - 7.5    Color Yellow Yellow    Appearance Clear Clear    Leukocyte Esterase 2+ (A) Negative    Protein Negative Negative/Trace    Glucose Negative Negative    Ketone Negative Negative    Blood 1+ (A) Negative    Bilirubin Negative Negative    Urobilinogen 0.2 0.2 - 1.0 mg/dL    Nitrites Positive (A) Negative    Microscopic Examination See additional order    MICROSCOPIC EXAMINATION   Result Value Ref Range    WBC 6-10 (A) 0 - 5 /hpf    RBC 3-10 (A) 0 - 2 /hpf    Epithelial cells 0-10 0 - 10 /hpf    Casts None seen None seen /lpf    Mucus Present Not Estab. Bacteria Few None seen/Few       IMAGING:  MRI Results (most recent):  Results from Hospital Encounter encounter on 05/10/17   MRI BRAIN WO CONT    Narrative EXAM:  MRI BRAIN WO CONT  INDICATION:  progressive dementia, M03.90,  TECHNIQUE: Sagittal FLAIR and T1, axial FLAIR, T2,T1 and gradient echo images as  well as coronal T2 weighted images and axial diffusion weighted images of the  head were obtained. COMPARISON:  CT head 2/22/12  FINDINGS:  Mild generalized sulcal and ventricular prominence is consistent with cerebral  volume loss and within the range of normal for age.   Multiple foci of T2 hyperintensity in the cerebral white matter with mild  increased since 2011 suggests chronic small vessel ischemic change. The left frank acute infarct demonstrated 7/6/11 now has cystic change. No evidence of hemorrhage, mass, acute infarction or abnormal extra-axial fluid  collections. Flow voids are present in the vertebral basilar and carotid artery systems. The craniocervical junction is unremarkable. The structures at the cranial base including paranasal sinuses are unremarkable. Impression IMPRESSION:   1. Findings consistent with mild chronic small vessel type ischemic white matter  disease. 2. Old left frank small lacunar infarct. 3. No acute intracranial abnormality demonstrated. Assessment:     Encounter Diagnoses     ICD-10-CM ICD-9-CM   1. Late onset Alzheimer's disease without behavioral disturbance (HCC)  G30.1 331.0    F02.80 294.10   2. History of CVA (cerebrovascular accident)  Z80.65 V14.48   66year old AAM presenting for f/u of progressive dementia. [address] [place] [address] 12/30 with significantly impaired visuospatial/executive functioning, attention, delayed recall. DDx: Mixed AD and vascular dementia. MRI Brain independently reviewed with patient revealing evidence of remote L pontine and L lacunar infarction. He is maintained on antiplatelet and statin therapy for stroke prevention. Discussed need for assistance moving forward when making executive decisions, assistance with medications (using a pill box), assistance with navigation and avoidance of traveling alone. Discussed treatment options in detail including risks/benefits and expectations. While medication is not likely to made a \"night and day\" difference in AD, it may aid modestly in improving concentration and attention. Medication titration and addition of adjunctive agents may be necessary to achieve maximum benefit. He did not tolerate Namzaric due to side effects (headache).   He self weaned off of Aricept due to ineffectiveness and cost.  We discussed alternative treatment options including rivastigmine and potential side effects. He has noted some benefit after initiation of Exelon. I advise that he limit driving to short distances during the day time presently. Plan:   Continue Exelon 3mg BID  Cont. B12 supplementation 1000mcg daily  Cont. ASA, lipitor for stroke prevention  Goal LDL<70, SBP<140  Heart healthy diet and exercise  Regular scheduled cognitive and social engagement. Follow-up and Dispositions    · Return in about 6 months (around 5/18/2021).          Signed:  Cedrick Patino,   11/18/2020

## 2021-02-10 ENCOUNTER — OFFICE VISIT (OUTPATIENT)
Dept: INTERNAL MEDICINE CLINIC | Age: 76
End: 2021-02-10
Payer: MEDICARE

## 2021-02-10 VITALS
HEIGHT: 69 IN | RESPIRATION RATE: 16 BRPM | HEART RATE: 65 BPM | SYSTOLIC BLOOD PRESSURE: 139 MMHG | BODY MASS INDEX: 35.24 KG/M2 | DIASTOLIC BLOOD PRESSURE: 100 MMHG | OXYGEN SATURATION: 95 % | WEIGHT: 237.9 LBS | TEMPERATURE: 98.3 F

## 2021-02-10 DIAGNOSIS — Z13.31 SCREENING FOR DEPRESSION: ICD-10-CM

## 2021-02-10 DIAGNOSIS — N40.0 PROSTATISM: ICD-10-CM

## 2021-02-10 DIAGNOSIS — G30.1 LATE ONSET ALZHEIMER'S DISEASE WITHOUT BEHAVIORAL DISTURBANCE (HCC): ICD-10-CM

## 2021-02-10 DIAGNOSIS — I10 ESSENTIAL HYPERTENSION: Primary | ICD-10-CM

## 2021-02-10 DIAGNOSIS — E78.5 DYSLIPIDEMIA: ICD-10-CM

## 2021-02-10 DIAGNOSIS — F02.80 LATE ONSET ALZHEIMER'S DISEASE WITHOUT BEHAVIORAL DISTURBANCE (HCC): ICD-10-CM

## 2021-02-10 DIAGNOSIS — J43.1 PANLOBULAR EMPHYSEMA (HCC): ICD-10-CM

## 2021-02-10 DIAGNOSIS — Z00.00 WELLNESS EXAMINATION: ICD-10-CM

## 2021-02-10 PROCEDURE — G8536 NO DOC ELDER MAL SCRN: HCPCS | Performed by: INTERNAL MEDICINE

## 2021-02-10 PROCEDURE — G8427 DOCREV CUR MEDS BY ELIG CLIN: HCPCS | Performed by: INTERNAL MEDICINE

## 2021-02-10 PROCEDURE — 36415 COLL VENOUS BLD VENIPUNCTURE: CPT | Performed by: INTERNAL MEDICINE

## 2021-02-10 PROCEDURE — G8755 DIAS BP > OR = 90: HCPCS | Performed by: INTERNAL MEDICINE

## 2021-02-10 PROCEDURE — G8432 DEP SCR NOT DOC, RNG: HCPCS | Performed by: INTERNAL MEDICINE

## 2021-02-10 PROCEDURE — G0439 PPPS, SUBSEQ VISIT: HCPCS | Performed by: INTERNAL MEDICINE

## 2021-02-10 PROCEDURE — 99215 OFFICE O/P EST HI 40 MIN: CPT | Performed by: INTERNAL MEDICINE

## 2021-02-10 PROCEDURE — G8752 SYS BP LESS 140: HCPCS | Performed by: INTERNAL MEDICINE

## 2021-02-10 PROCEDURE — G8417 CALC BMI ABV UP PARAM F/U: HCPCS | Performed by: INTERNAL MEDICINE

## 2021-02-10 PROCEDURE — 1101F PT FALLS ASSESS-DOCD LE1/YR: CPT | Performed by: INTERNAL MEDICINE

## 2021-02-10 PROCEDURE — G0444 DEPRESSION SCREEN ANNUAL: HCPCS | Performed by: INTERNAL MEDICINE

## 2021-02-10 NOTE — PROGRESS NOTES
Chief Complaint   Patient presents with   [address] [place] [address] Annual Wellness Visit           1. Have you been to the ER, urgent care clinic since your last visit? Hospitalized since your last visit? No    2. Have you seen or consulted any other health care providers outside of the [address] [place] [address] since your last visit? Include any pap smears or colon screening.  No

## 2021-02-12 LAB
ALBUMIN SERPL-MCNC: 4 G/DL (ref 3.5–5)
ALBUMIN/GLOB SERPL: 1.3 {RATIO} (ref 1.1–2.2)
ALP SERPL-CCNC: 84 U/L (ref 45–117)
ALT SERPL-CCNC: 29 U/L (ref 12–78)
ANION GAP SERPL CALC-SCNC: 8 MMOL/L (ref 5–15)
APO B SERPL-MCNC: 106 MG/DL
APPEARANCE UR: CLEAR
AST SERPL-CCNC: 16 U/L (ref 15–37)
BASOPHILS # BLD: 0.1 K/UL (ref 0–0.1)
BASOPHILS NFR BLD: 1 % (ref 0–1)
BILIRUB SERPL-MCNC: 0.8 MG/DL (ref 0.2–1)
BILIRUB UR QL: NEGATIVE
BUN SERPL-MCNC: 14 MG/DL (ref 6–20)
BUN/CREAT SERPL: 13 (ref 12–20)
CALCIUM SERPL-MCNC: 9.3 MG/DL (ref 8.5–10.1)
CHLORIDE SERPL-SCNC: 106 MMOL/L (ref 97–108)
CHOLEST SERPL-MCNC: 199 MG/DL
CO2 SERPL-SCNC: 25 MMOL/L (ref 21–32)
COLOR UR: NORMAL
CREAT SERPL-MCNC: 1.06 MG/DL (ref 0.7–1.3)
DIFFERENTIAL METHOD BLD: ABNORMAL
EOSINOPHIL # BLD: 0.8 K/UL (ref 0–0.4)
EOSINOPHIL NFR BLD: 10 % (ref 0–7)
ERYTHROCYTE [DISTWIDTH] IN BLOOD BY AUTOMATED COUNT: 14 % (ref 11.5–14.5)
GLOBULIN SER CALC-MCNC: 3.2 G/DL (ref 2–4)
GLUCOSE SERPL-MCNC: 82 MG/DL (ref 65–100)
GLUCOSE UR STRIP.AUTO-MCNC: NEGATIVE MG/DL
HCT VFR BLD AUTO: 42.5 % (ref 36.6–50.3)
HDLC SERPL-MCNC: 61 MG/DL
HDLC SERPL: 3.3 {RATIO} (ref 0–5)
HGB BLD-MCNC: 13.5 G/DL (ref 12.1–17)
HGB UR QL STRIP: NEGATIVE
IMM GRANULOCYTES # BLD AUTO: 0 K/UL (ref 0–0.04)
IMM GRANULOCYTES NFR BLD AUTO: 0 % (ref 0–0.5)
KETONES UR QL STRIP.AUTO: NEGATIVE MG/DL
LDLC SERPL CALC-MCNC: 116.2 MG/DL (ref 0–100)
LEUKOCYTE ESTERASE UR QL STRIP.AUTO: NEGATIVE
LIPID PROFILE,FLP: ABNORMAL
LYMPHOCYTES # BLD: 1.9 K/UL (ref 0.8–3.5)
LYMPHOCYTES NFR BLD: 23 % (ref 12–49)
MCH RBC QN AUTO: 27.6 PG (ref 26–34)
MCHC RBC AUTO-ENTMCNC: 31.8 G/DL (ref 30–36.5)
MCV RBC AUTO: 86.9 FL (ref 80–99)
MONOCYTES # BLD: 0.8 K/UL (ref 0–1)
MONOCYTES NFR BLD: 10 % (ref 5–13)
NEUTS SEG # BLD: 4.4 K/UL (ref 1.8–8)
NEUTS SEG NFR BLD: 56 % (ref 32–75)
NITRITE UR QL STRIP.AUTO: NEGATIVE
NRBC # BLD: 0 K/UL (ref 0–0.01)
NRBC BLD-RTO: 0 PER 100 WBC
PH UR STRIP: 5 [PH] (ref 5–8)
PLATELET # BLD AUTO: 206 K/UL (ref 150–400)
PMV BLD AUTO: 11 FL (ref 8.9–12.9)
POTASSIUM SERPL-SCNC: 4.2 MMOL/L (ref 3.5–5.1)
PROT SERPL-MCNC: 7.2 G/DL (ref 6.4–8.2)
PROT UR STRIP-MCNC: NEGATIVE MG/DL
PSA SERPL-MCNC: 0.5 NG/ML (ref 0.01–4)
RBC # BLD AUTO: 4.89 M/UL (ref 4.1–5.7)
SODIUM SERPL-SCNC: 139 MMOL/L (ref 136–145)
SP GR UR REFRACTOMETRY: 1.01 (ref 1–1.03)
TRIGL SERPL-MCNC: 109 MG/DL (ref ?–150)
UROBILINOGEN UR QL STRIP.AUTO: 0.2 EU/DL (ref 0.2–1)
VLDLC SERPL CALC-MCNC: 21.8 MG/DL
WBC # BLD AUTO: 7.9 K/UL (ref 4.1–11.1)

## 2021-02-13 NOTE — PATIENT INSTRUCTIONS
Medicare Wellness Visit, Male The best way to live healthy is to have a lifestyle where you eat a well-balanced diet, exercise regularly, limit alcohol use, and quit all forms of tobacco/nicotine, if applicable. Regular preventive services are another way to keep healthy. Preventive services (vaccines, screening tests, monitoring & exams) can help personalize your care plan, which helps you manage your own care. Screening tests can find health problems at the earliest stages, when they are easiest to treat. [facility] follows the current, evidence-based guidelines published by the [facility] ([facility]) when recommending preventive services for our patients. Because we follow these guidelines, sometimes recommendations change over time as research supports it. (For example, a prostate screening blood test is no longer routinely recommended for men with no symptoms). Of course, you and your doctor may decide to screen more often for some diseases, based on your risk and co-morbidities (chronic disease you are already diagnosed with). Preventive services for you include: - Medicare offers their members a free annual wellness visit, which is time for you and your primary care provider to discuss and plan for your preventive service needs. Take advantage of this benefit every year! 
-All adults over age 72 should receive the recommended pneumonia vaccines. Current USPSTF guidelines recommend a series of two vaccines for the best pneumonia protection.  
-All adults should have a flu vaccine yearly and tetanus vaccine every 10 years. 
-All adults age 48 and older should receive the shingles vaccines (series of two vaccines). -All adults age 38-68 who are overweight should have a diabetes screening test once every three years. -Other screening tests & preventive services for persons with diabetes include: an eye exam to screen for diabetic retinopathy, a kidney function test, a foot exam, and stricter control over your cholesterol.  
-Cardiovascular screening for adults with routine risk involves an electrocardiogram (ECG) at intervals determined by the provider.  
-Colorectal cancer screening should be done for adults age 54-65 with no increased risk factors for colorectal cancer. There are a number of acceptable methods of screening for this type of cancer. Each test has its own benefits and drawbacks. Discuss with your provider what is most appropriate for you during your annual wellness visit. The different tests include: colonoscopy (considered the best screening method), a fecal occult blood test, a fecal DNA test, and sigmoidoscopy. 
-All adults born between [facility] should be screened once for Hepatitis C. 
-An Abdominal Aortic Aneurysm (AAA) Screening is recommended for men age 73-68 who has ever smoked in their lifetime. Here is a list of your current Health Maintenance items (your personalized list of preventive services) with a due date: 
Health Maintenance Due Topic Date Due  
 COVID-19 Vaccine (1 of 2) 12/11/1961  Glaucoma Screening   10/27/2019

## 2021-02-13 NOTE — PROGRESS NOTES
[address] [facility]  [facility]  [address] [place] [address]  Phone:  [phone]  Fax: [phone]       Chief Complaint   Patient presents with   [facility] Wellness Visit   . SUBJECTIVE:    Moriah Ayala is a 66 y.o. male comes in for return visit stating that he has done reasonably well. He does have progressive dementia and according to his wife it is getting gradually worse with the principal manifestation being impairment of his short-term memory. He answers my questions appropriately as is often times the case. Unfortunately, he continues to smoke cigarettes. He has existing COPD. Above and beyond that, his poor atherogenic state has created problem with a previous CVA. In spite of this, he refuses to stop smoking. There is a past history of prostatism as well as hypertension and dyslipidemia. Current Outpatient Medications   Medication Sig Dispense Refill    rivastigmine tartrate (EXELON) 3 mg capsule TAKE 1 CAPSULE BY MOUTH TWICE A DAY WITH MEALS 180 Cap 1    finasteride (PROSCAR) 5 mg tablet TAKE 1 TABLET BY MOUTH EVERY DAY 90 Tab 3    atorvastatin (LIPITOR) 80 mg tablet 1 tablet daily 90 Tab 3    losartan (COZAAR) 50 mg tablet Take 1 Tab by mouth daily. 90 Tab 3    aspirin (ASPIRIN) 325 mg tablet Take 325 mg by mouth daily.  cyanocobalamin (VITAMIN B12) 500 mcg tablet Take 2 Tabs by mouth daily.  61 Tab 5     Past Medical History:   Diagnosis Date    Chronic obstructive pulmonary disease ([address]     Hypercholesterolemia     Hypertension     Sarcoidosis, lung ([address]     Stroke ([address]     CVA in distribution RMA---no sequelae    Thyroid disease      Past Surgical History:   Procedure Laterality Date    HX COLONOSCOPY       No Known Allergies      REVIEW OF SYSTEMS:  General: negative for - chills or fever  ENT: negative for - headaches, nasal congestion or tinnitus  Respiratory: negative for - cough, hemoptysis, shortness of breath or wheezing  Cardiovascular : negative for - chest pain, edema, palpitations or shortness of breath  Gastrointestinal: negative for - abdominal pain, blood in stools, heartburn or nausea/vomiting  Genito-Urinary: no dysuria, trouble voiding, or hematuria  Musculoskeletal: negative for - gait disturbance, joint pain, joint stiffness or joint swelling  Neurological: no TIA or stroke symptoms  Hematologic: no bruises, no bleeding, no swollen glands  Integument: no lumps, mole changes, nail changes or rash  Endocrine: no malaise/lethargy or unexpected weight changes      Social History     Socioeconomic History    Marital status:      Spouse name: Joe Camarillo    Number of children: Not on file    Years of education: 3    Highest education level: Not on file   Occupational History    Occupation: Retired   Tobacco Use    Smoking status: Current Some Day Smoker     Packs/day: 0.25    Smokeless tobacco: Never Used   Substance and Sexual Activity    Alcohol use: Yes     Comment: Socially    Drug use: No    Sexual activity: Not Currently     Family History   Problem Relation Age of Onset    Diabetes Mother     Diabetes Brother        OBJECTIVE:    Visit Vitals  BP (!) 139/100   Pulse 65   Temp 98.3 °F (36.8 °C) (Oral)   Resp 16   Ht 5' 9\" (1.753 m)   Wt 237 lb 14.4 oz (107.9 kg)   SpO2 95%   BMI 35.13 kg/m²     CONSTITUTIONAL: well , well nourished, appears age appropriate  EYES: perrla, eom intact  ENMT:moist mucous membranes, pharynx clear  NECK: supple. Thyroid normal  RESPIRATORY: Chest: clear to ascultation and percussion   CARDIOVASCULAR: Heart: regular rate and rhythm  GASTROINTESTINAL: Abdomen: soft, bowel sounds active  HEMATOLOGIC: no pathological lymph nodes palpated  MUSCULOSKELETAL: Extremities: no edema, pulse 1+   INTEGUMENT: No unusual rashes or suspicious skin lesions noted.  Nails appear normal.  NEUROLOGIC: non-focal exam   MENTAL STATUS: alert and oriented, appropriate affect      ASSESSMENT:  1. Essential hypertension    2. Late onset Alzheimer's disease without behavioral disturbance ([facility]    3. Prostatism    4. Panlobular emphysema ([facility]    5. Dyslipidemia        PLAN:  1. Blood pressure appears to be reasonably stable. 2. His dementia is reasonably stable. He is taking his medication appropriate for this. There is not much else to do. He is in a very safe setting with his wife attending to his appropriate needs given his impaired short-term memory. 3. He continues with his prostatism and does follow with Urology on rare occasions. 4. Unfortunately, he has continued cigarette smoking with progressive COPD, but he is relatively asymptomatic for now. Obviously the poor atherogenic status and oncogenic propensity persists. His inability to understand the consequences create his noncompliant behavior as it relates to continued cigarette smoking. 5. He remains on a statin and he is in a secondary risk prevention mode created by his previous CVA. Efficacy and compliance would be assessed. Orders Placed This Encounter    APOLIPOPROTEIN B    CBC WITH AUTOMATED DIFF    LIPID PANEL    METABOLIC PANEL, COMPREHENSIVE    PROSTATE SPECIFIC AG    URINALYSIS W/ RFLX Ab Cobian MD  This is the Subsequent Medicare Annual Wellness Exam, performed 12 months or more after the Initial AWV or the last Subsequent AWV    I have reviewed the patient's medical history in detail and updated the computerized patient record.      Depression Risk Factor Screening:     3 most recent PHQ Screens 3/11/2020   PHQ Not Done -   Little interest or pleasure in doing things Not at all   Feeling down, depressed, irritable, or hopeless Not at all   Total Score PHQ 2 0       Alcohol Risk Screen    Do you average more than 1 drink per night or more than 7 drinks a week: No    In the past three months have you have had more than 4 drinks containing alcohol on one occasion: No        Functional Ability and Level of Safety:    Hearing: Hearing is good. Activities of Daily Living: The home contains: no safety equipment. Patient does total self care      Ambulation: with no difficulty     Fall Risk:  Fall Risk Assessment, last 12 mths 11/8/2019   Able to walk? Yes   Fall in past 12 months? No      Abuse Screen:  Patient is not abused       Cognitive Screening    Has your family/caregiver stated any concerns about your memory: yes - Known dementia     Cognitive Screening: Known dementia    Assessment/Plan   Education and counseling provided:  Are appropriate based on today's review and evaluation    Diagnoses and all orders for this visit:    1. Essential hypertension  -     CBC WITH AUTOMATED DIFF; Future  -     COLLECTION VENOUS BLOOD,VENIPUNCTURE  -     METABOLIC PANEL, COMPREHENSIVE; Future  -     URINALYSIS W/ RFLX MICROSCOPIC; Future    2. Late onset Alzheimer's disease without behavioral disturbance ([facility]    3. Prostatism  -     PSA, DIAGNOSTIC (PROSTATE SPECIFIC AG); Future    4. Panlobular emphysema ([facility]    5. Dyslipidemia  -     APOLIPOPROTEIN B; Future  -     LIPID PANEL;  Future        Health Maintenance Due     Health Maintenance Due   Topic Date Due    COVID-19 Vaccine (1 of 2) 12/11/1961    GLAUCOMA SCREENING Q2Y  10/27/2019       Patient Care Team   Patient Care Team:  Soham Crow MD as PCP - General (Internal Medicine)  Soham Crow MD as PCP - [facility] Empaneled Provider    History     Patient Active Problem List   Diagnosis Code    COPD (chronic obstructive pulmonary disease) ([facility] J44.9    Asthma J45.909    History of CVA (cerebrovascular accident) Z86.73    Dyslipidemia E78.5    Hypertension I10    Prostatism N40.0    Benign prostatic hyperplasia with lower urinary tract symptoms N40.1    Late onset Alzheimer's disease without behavioral disturbance (HCC) G30.1, F02.80    Dementia without behavioral disturbance ([address] F03.90     Past Medical History:   Diagnosis Date    Chronic obstructive pulmonary disease ([facility]     Hypercholesterolemia     Hypertension     [facility]     Stroke ([facility]     CVA in distribution RMA---no sequelae    Thyroid disease       Past Surgical History:   Procedure Laterality Date    HX COLONOSCOPY       Current Outpatient Medications   Medication Sig Dispense Refill    rivastigmine tartrate (EXELON) 3 mg capsule TAKE 1 CAPSULE BY MOUTH TWICE A DAY WITH MEALS 180 Cap 1    finasteride (PROSCAR) 5 mg tablet TAKE 1 TABLET BY MOUTH EVERY DAY 90 Tab 3    atorvastatin (LIPITOR) 80 mg tablet 1 tablet daily 90 Tab 3    losartan (COZAAR) 50 mg tablet Take 1 Tab by mouth daily. 90 Tab 3    aspirin (ASPIRIN) 325 mg tablet Take 325 mg by mouth daily.  cyanocobalamin (VITAMIN B12) 500 mcg tablet Take 2 Tabs by mouth daily.  61 Tab 5     No Known Allergies    Family History   Problem Relation Age of Onset    Diabetes Mother     Diabetes Brother      Social History     Tobacco Use    Smoking status: Current Some Day Smoker     Packs/day: 0.25    Smokeless tobacco: Never Used   Substance Use Topics    Alcohol use: Yes     Comment: Socially

## 2021-02-20 ENCOUNTER — IMMUNIZATION (OUTPATIENT)
Dept: INTERNAL MEDICINE CLINIC | Age: 76
End: 2021-02-20
Payer: MEDICARE

## 2021-02-20 DIAGNOSIS — Z23 ENCOUNTER FOR IMMUNIZATION: Primary | ICD-10-CM

## 2021-02-20 PROCEDURE — 0001A COVID-19, MRNA, LNP-S, PF, 30MCG/0.3ML DOSE(PFIZER): CPT | Performed by: FAMILY MEDICINE

## 2021-02-20 PROCEDURE — 91300 COVID-19, MRNA, LNP-S, PF, 30MCG/0.3ML DOSE(PFIZER): CPT | Performed by: FAMILY MEDICINE

## 2021-03-13 ENCOUNTER — IMMUNIZATION (OUTPATIENT)
Dept: INTERNAL MEDICINE CLINIC | Age: 76
End: 2021-03-13
Payer: MEDICARE

## 2021-03-13 DIAGNOSIS — Z23 ENCOUNTER FOR IMMUNIZATION: Primary | ICD-10-CM

## 2021-03-13 PROCEDURE — 0002A COVID-19, MRNA, LNP-S, PF, 30MCG/0.3ML DOSE(PFIZER): CPT | Performed by: FAMILY MEDICINE

## 2021-03-13 PROCEDURE — 91300 COVID-19, MRNA, LNP-S, PF, 30MCG/0.3ML DOSE(PFIZER): CPT | Performed by: FAMILY MEDICINE

## 2021-05-03 RX ORDER — FINASTERIDE 5 MG/1
TABLET, FILM COATED ORAL
Qty: 90 TAB | Refills: 3 | Status: SHIPPED | OUTPATIENT
Start: 2021-05-03 | End: 2022-04-12

## 2021-05-03 RX ORDER — ATORVASTATIN CALCIUM 80 MG/1
TABLET, FILM COATED ORAL
Qty: 90 TAB | Refills: 3 | Status: SHIPPED | OUTPATIENT
Start: 2021-05-03 | End: 2022-04-10

## 2021-08-28 RX ORDER — LOSARTAN POTASSIUM 50 MG/1
TABLET ORAL
Qty: 90 TABLET | Refills: 3 | Status: SHIPPED | OUTPATIENT
Start: 2021-08-28 | End: 2022-10-09

## 2022-02-24 ENCOUNTER — OFFICE VISIT (OUTPATIENT)
Dept: INTERNAL MEDICINE CLINIC | Age: 77
End: 2022-02-24
Payer: MEDICARE

## 2022-02-24 VITALS
SYSTOLIC BLOOD PRESSURE: 140 MMHG | HEIGHT: 69 IN | DIASTOLIC BLOOD PRESSURE: 70 MMHG | RESPIRATION RATE: 18 BRPM | WEIGHT: 235.5 LBS | OXYGEN SATURATION: 94 % | BODY MASS INDEX: 34.88 KG/M2 | TEMPERATURE: 98.1 F | HEART RATE: 65 BPM

## 2022-02-24 DIAGNOSIS — I10 PRIMARY HYPERTENSION: Primary | ICD-10-CM

## 2022-02-24 DIAGNOSIS — J43.1 PANLOBULAR EMPHYSEMA (HCC): ICD-10-CM

## 2022-02-24 DIAGNOSIS — Z00.00 WELLNESS EXAMINATION: ICD-10-CM

## 2022-02-24 DIAGNOSIS — Z13.31 SCREENING FOR DEPRESSION: ICD-10-CM

## 2022-02-24 DIAGNOSIS — F03.90 DEMENTIA WITHOUT BEHAVIORAL DISTURBANCE, UNSPECIFIED DEMENTIA TYPE: ICD-10-CM

## 2022-02-24 DIAGNOSIS — Z11.59 NEED FOR HEPATITIS C SCREENING TEST: ICD-10-CM

## 2022-02-24 DIAGNOSIS — E78.5 DYSLIPIDEMIA: ICD-10-CM

## 2022-02-24 DIAGNOSIS — I63.9 CEREBROVASCULAR ACCIDENT (CVA), UNSPECIFIED MECHANISM (HCC): ICD-10-CM

## 2022-02-24 DIAGNOSIS — N40.1 BENIGN PROSTATIC HYPERPLASIA WITH LOWER URINARY TRACT SYMPTOMS, SYMPTOM DETAILS UNSPECIFIED: ICD-10-CM

## 2022-02-24 PROCEDURE — G8754 DIAS BP LESS 90: HCPCS | Performed by: INTERNAL MEDICINE

## 2022-02-24 PROCEDURE — G8417 CALC BMI ABV UP PARAM F/U: HCPCS | Performed by: INTERNAL MEDICINE

## 2022-02-24 PROCEDURE — 99214 OFFICE O/P EST MOD 30 MIN: CPT | Performed by: INTERNAL MEDICINE

## 2022-02-24 PROCEDURE — G0439 PPPS, SUBSEQ VISIT: HCPCS | Performed by: INTERNAL MEDICINE

## 2022-02-24 PROCEDURE — G8510 SCR DEP NEG, NO PLAN REQD: HCPCS | Performed by: INTERNAL MEDICINE

## 2022-02-24 PROCEDURE — G8753 SYS BP > OR = 140: HCPCS | Performed by: INTERNAL MEDICINE

## 2022-02-24 PROCEDURE — G8427 DOCREV CUR MEDS BY ELIG CLIN: HCPCS | Performed by: INTERNAL MEDICINE

## 2022-02-24 PROCEDURE — 1101F PT FALLS ASSESS-DOCD LE1/YR: CPT | Performed by: INTERNAL MEDICINE

## 2022-02-24 PROCEDURE — G8536 NO DOC ELDER MAL SCRN: HCPCS | Performed by: INTERNAL MEDICINE

## 2022-02-24 NOTE — PROGRESS NOTES
Chief Complaint   Patient presents with   [facility]el[facility] Rosales Annual Wellness Visit         1. Have you been to the ER, urgent care clinic since your last visit? Hospitalized since your last visit? No    2. Have you seen or consulted any other health care providers outside of the [address] [place] [address] since your last visit? Include any pap smears or colon screening.  No

## 2022-02-25 LAB
ALBUMIN SERPL-MCNC: 3.6 G/DL (ref 3.5–5)
ALBUMIN/GLOB SERPL: 1.1 {RATIO} (ref 1.1–2.2)
ALP SERPL-CCNC: 84 U/L (ref 45–117)
ALT SERPL-CCNC: 29 U/L (ref 12–78)
ANION GAP SERPL CALC-SCNC: 3 MMOL/L (ref 5–15)
APPEARANCE UR: CLEAR
AST SERPL-CCNC: 14 U/L (ref 15–37)
BASOPHILS # BLD: 0 K/UL (ref 0–0.1)
BASOPHILS NFR BLD: 1 % (ref 0–1)
BILIRUB SERPL-MCNC: 1.4 MG/DL (ref 0.2–1)
BILIRUB UR QL: NEGATIVE
BUN SERPL-MCNC: 19 MG/DL (ref 6–20)
BUN/CREAT SERPL: 16 (ref 12–20)
CALCIUM SERPL-MCNC: 9.4 MG/DL (ref 8.5–10.1)
CHLORIDE SERPL-SCNC: 111 MMOL/L (ref 97–108)
CHOLEST SERPL-MCNC: 185 MG/DL
CO2 SERPL-SCNC: 26 MMOL/L (ref 21–32)
COLOR UR: ABNORMAL
COMMENT, HOLDF: NORMAL
CREAT SERPL-MCNC: 1.21 MG/DL (ref 0.7–1.3)
CRP SERPL HS-MCNC: 2.9 MG/L
DIFFERENTIAL METHOD BLD: ABNORMAL
EOSINOPHIL # BLD: 0.6 K/UL (ref 0–0.4)
EOSINOPHIL NFR BLD: 8 % (ref 0–7)
ERYTHROCYTE [DISTWIDTH] IN BLOOD BY AUTOMATED COUNT: 13.7 % (ref 11.5–14.5)
GLOBULIN SER CALC-MCNC: 3.3 G/DL (ref 2–4)
GLUCOSE SERPL-MCNC: 94 MG/DL (ref 65–100)
GLUCOSE UR STRIP.AUTO-MCNC: NEGATIVE MG/DL
HCT VFR BLD AUTO: 42.4 % (ref 36.6–50.3)
HCV AB SERPL QL IA: NONREACTIVE
HDLC SERPL-MCNC: 60 MG/DL
HDLC SERPL: 3.1 {RATIO} (ref 0–5)
HGB BLD-MCNC: 13 G/DL (ref 12.1–17)
HGB UR QL STRIP: NEGATIVE
IMM GRANULOCYTES # BLD AUTO: 0.1 K/UL (ref 0–0.04)
IMM GRANULOCYTES NFR BLD AUTO: 1 % (ref 0–0.5)
KETONES UR QL STRIP.AUTO: NEGATIVE MG/DL
LDLC SERPL CALC-MCNC: 105.8 MG/DL (ref 0–100)
LEUKOCYTE ESTERASE UR QL STRIP.AUTO: NEGATIVE
LYMPHOCYTES # BLD: 2.1 K/UL (ref 0.8–3.5)
LYMPHOCYTES NFR BLD: 26 % (ref 12–49)
MCH RBC QN AUTO: 27.5 PG (ref 26–34)
MCHC RBC AUTO-ENTMCNC: 30.7 G/DL (ref 30–36.5)
MCV RBC AUTO: 89.8 FL (ref 80–99)
MONOCYTES # BLD: 0.7 K/UL (ref 0–1)
MONOCYTES NFR BLD: 9 % (ref 5–13)
NEUTS SEG # BLD: 4.4 K/UL (ref 1.8–8)
NEUTS SEG NFR BLD: 55 % (ref 32–75)
NITRITE UR QL STRIP.AUTO: NEGATIVE
NRBC # BLD: 0 K/UL (ref 0–0.01)
NRBC BLD-RTO: 0 PER 100 WBC
PH UR STRIP: 6.5 [PH] (ref 5–8)
PLATELET # BLD AUTO: 205 K/UL (ref 150–400)
PMV BLD AUTO: 11 FL (ref 8.9–12.9)
POTASSIUM SERPL-SCNC: 4.1 MMOL/L (ref 3.5–5.1)
PROT SERPL-MCNC: 6.9 G/DL (ref 6.4–8.2)
PROT UR STRIP-MCNC: NEGATIVE MG/DL
PSA SERPL-MCNC: 0.6 NG/ML (ref 0.01–4)
RBC # BLD AUTO: 4.72 M/UL (ref 4.1–5.7)
SAMPLES BEING HELD,HOLD: NORMAL
SODIUM SERPL-SCNC: 140 MMOL/L (ref 136–145)
SP GR UR REFRACTOMETRY: 1.02 (ref 1–1.03)
TRIGL SERPL-MCNC: 96 MG/DL (ref ?–150)
UROBILINOGEN UR QL STRIP.AUTO: 2 EU/DL (ref 0.2–1)
VLDLC SERPL CALC-MCNC: 19.2 MG/DL
WBC # BLD AUTO: 8 K/UL (ref 4.1–11.1)

## 2022-02-26 LAB — APO B SERPL-MCNC: 92 MG/DL

## 2022-03-01 ENCOUNTER — TELEPHONE (OUTPATIENT)
Dept: INTERNAL MEDICINE CLINIC | Age: 77
End: 2022-03-01

## 2022-03-01 NOTE — PATIENT INSTRUCTIONS
Medicare Wellness Visit, Male    The best way to live healthy is to have a lifestyle where you eat a well-balanced diet, exercise regularly, limit alcohol use, and quit all forms of tobacco/nicotine, if applicable. Regular preventive services are another way to keep healthy. Preventive services (vaccines, screening tests, monitoring & exams) can help personalize your care plan, which helps you manage your own care. Screening tests can find health problems at the earliest stages, when they are easiest to treat. [facility] follows the current, evidence-based guidelines published by the [facility] Suresh Tang ([facility]) when recommending preventive services for our patients. Because we follow these guidelines, sometimes recommendations change over time as research supports it. (For example, a prostate screening blood test is no longer routinely recommended for men with no symptoms). Of course, you and your doctor may decide to screen more often for some diseases, based on your risk and co-morbidities (chronic disease you are already diagnosed with). Preventive services for you include:  - Medicare offers their members a free annual wellness visit, which is time for you and your primary care provider to discuss and plan for your preventive service needs. Take advantage of this benefit every year!  -All adults over age 72 should receive the recommended pneumonia vaccines. Current USPSTF guidelines recommend a series of two vaccines for the best pneumonia protection.   -All adults should have a flu vaccine yearly and tetanus vaccine every 10 years.  -All adults age 48 and older should receive the shingles vaccines (series of two vaccines).        -All adults age 38-68 who are overweight should have a diabetes screening test once every three years.   -Other screening tests & preventive services for persons with diabetes include: an eye exam to screen for diabetic retinopathy, a kidney function test, a foot exam, and stricter control over your cholesterol.   -Cardiovascular screening for adults with routine risk involves an electrocardiogram (ECG) at intervals determined by the provider.   -Colorectal cancer screening should be done for adults age 54-65 with no increased risk factors for colorectal cancer. There are a number of acceptable methods of screening for this type of cancer. Each test has its own benefits and drawbacks. Discuss with your provider what is most appropriate for you during your annual wellness visit. The different tests include: colonoscopy (considered the best screening method), a fecal occult blood test, a fecal DNA test, and sigmoidoscopy.  -All adults born between [facility] should be screened once for Hepatitis C.  -An Abdominal Aortic Aneurysm (AAA) Screening is recommended for men age 73-68 who has ever smoked in their lifetime.      Here is a list of your current Health Maintenance items (your personalized list of preventive services) with a due date:  Health Maintenance Due   Topic Date Due    Shingles Vaccine (1 of 2) Never done    Pneumococcal Vaccine (1 of 1 - PPSV23) Never done    Yearly Flu Vaccine (1) 09/01/2021
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
## 2022-03-18 PROBLEM — N40.1 BENIGN PROSTATIC HYPERPLASIA WITH LOWER URINARY TRACT SYMPTOMS: Status: ACTIVE | Noted: 2017-04-24

## 2022-03-19 PROBLEM — G30.1 LATE ONSET ALZHEIMER'S DISEASE WITHOUT BEHAVIORAL DISTURBANCE (HCC): Status: ACTIVE | Noted: 2017-07-13

## 2022-03-19 PROBLEM — F03.90 DEMENTIA WITHOUT BEHAVIORAL DISTURBANCE (HCC): Status: ACTIVE | Noted: 2017-09-25

## 2022-03-19 PROBLEM — F02.80 LATE ONSET ALZHEIMER'S DISEASE WITHOUT BEHAVIORAL DISTURBANCE (HCC): Status: ACTIVE | Noted: 2017-07-13

## 2022-04-10 RX ORDER — ATORVASTATIN CALCIUM 80 MG/1
TABLET, FILM COATED ORAL
Qty: 90 TABLET | Refills: 3 | Status: SHIPPED | OUTPATIENT
Start: 2022-04-10

## 2022-04-12 RX ORDER — FINASTERIDE 5 MG/1
TABLET, FILM COATED ORAL
Qty: 90 TABLET | Refills: 3 | Status: SHIPPED | OUTPATIENT
Start: 2022-04-12

## 2022-08-24 ENCOUNTER — OFFICE VISIT (OUTPATIENT)
Dept: INTERNAL MEDICINE CLINIC | Age: 77
End: 2022-08-24
Payer: MEDICARE

## 2022-08-24 DIAGNOSIS — I10 PRIMARY HYPERTENSION: Primary | ICD-10-CM

## 2022-08-24 DIAGNOSIS — F03.90 DEMENTIA WITHOUT BEHAVIORAL DISTURBANCE, UNSPECIFIED DEMENTIA TYPE: ICD-10-CM

## 2022-08-24 DIAGNOSIS — E78.5 DYSLIPIDEMIA: ICD-10-CM

## 2022-08-24 DIAGNOSIS — N40.0 PROSTATISM: ICD-10-CM

## 2022-08-24 DIAGNOSIS — J43.1 PANLOBULAR EMPHYSEMA (HCC): ICD-10-CM

## 2022-08-24 DIAGNOSIS — Z86.73 HISTORY OF CVA (CEREBROVASCULAR ACCIDENT): ICD-10-CM

## 2022-08-24 PROCEDURE — 1123F ACP DISCUSS/DSCN MKR DOCD: CPT | Performed by: INTERNAL MEDICINE

## 2022-08-24 PROCEDURE — 1101F PT FALLS ASSESS-DOCD LE1/YR: CPT | Performed by: INTERNAL MEDICINE

## 2022-08-24 PROCEDURE — G8427 DOCREV CUR MEDS BY ELIG CLIN: HCPCS | Performed by: INTERNAL MEDICINE

## 2022-08-24 PROCEDURE — G8417 CALC BMI ABV UP PARAM F/U: HCPCS | Performed by: INTERNAL MEDICINE

## 2022-08-24 PROCEDURE — G8753 SYS BP > OR = 140: HCPCS | Performed by: INTERNAL MEDICINE

## 2022-08-24 PROCEDURE — G8536 NO DOC ELDER MAL SCRN: HCPCS | Performed by: INTERNAL MEDICINE

## 2022-08-24 PROCEDURE — G8510 SCR DEP NEG, NO PLAN REQD: HCPCS | Performed by: INTERNAL MEDICINE

## 2022-08-24 PROCEDURE — 99214 OFFICE O/P EST MOD 30 MIN: CPT | Performed by: INTERNAL MEDICINE

## 2022-08-24 PROCEDURE — G8754 DIAS BP LESS 90: HCPCS | Performed by: INTERNAL MEDICINE

## 2022-08-24 NOTE — PROGRESS NOTES
Bebo Frazier is a 64 y.o. male  Chief Complaint   Patient presents with    Follow-up     Patient here for 6 month follow up. 1. Have you been to the ER, urgent care clinic since your last visit? Hospitalized since your last visit? No    2. Have you seen or consulted any other health care providers outside of the [address] [place] [address] since your last visit? Include any pap smears or colon screening.  No

## 2022-08-28 VITALS
OXYGEN SATURATION: 98 % | WEIGHT: 209.9 LBS | TEMPERATURE: 98.5 F | HEART RATE: 68 BPM | DIASTOLIC BLOOD PRESSURE: 78 MMHG | HEIGHT: 69 IN | BODY MASS INDEX: 31.09 KG/M2 | SYSTOLIC BLOOD PRESSURE: 118 MMHG | RESPIRATION RATE: 18 BRPM

## 2022-08-28 NOTE — PROGRESS NOTES
[address] [place] [address] and Primary Care  [place] [address]  [address] [place] [address]  Phone:  [phone]  Fax: [phone]       Chief Complaint   Patient presents with    Follow-up     Patient here for 6 month follow up. .      SUBJECTIVE:    Shania Bai is a 64 y.o. male comes in for return visit stating that he has done well. According to his wife, he is a bit more anxious and impatient. His short-term memory continues to worsen but he communicates with me quite well. He has a past history of primary hypertension, dyslipidemia, COPD, and has cerebrovascular disease. Current Outpatient Medications   Medication Sig Dispense Refill    finasteride (PROSCAR) 5 mg tablet TAKE 1 TABLET BY MOUTH EVERY DAY 90 Tablet 3    atorvastatin (LIPITOR) 80 mg tablet TAKE 1 TABLET BY MOUTH EVERY DAY 90 Tablet 3    losartan (COZAAR) 50 mg tablet TAKE 1 TABLET BY MOUTH EVERY DAY 90 Tablet 3    rivastigmine tartrate (EXELON) 3 mg capsule TAKE 1 CAPSULE BY MOUTH TWICE A DAY WITH MEALS 180 Cap 1    aspirin (ASPIRIN) 325 mg tablet Take 325 mg by mouth daily. cyanocobalamin (VITAMIN B12) 500 mcg tablet Take 2 Tabs by mouth daily.  (Patient not taking: No sig reported) 60 Tab 5     Past Medical History:   Diagnosis Date    Chronic obstructive pulmonary disease (HCC)     Hypercholesterolemia     Hypertension     Sarcoidosis, lung ([facility]     Stroke ([facility]     CVA in distribution RMA---no sequelae    Thyroid disease      Past Surgical History:   Procedure Laterality Date    HX COLONOSCOPY       No Known Allergies      REVIEW OF SYSTEMS:  General: negative for - chills or fever  ENT: negative for - headaches, nasal congestion or tinnitus  Respiratory: negative for - cough, hemoptysis, shortness of breath or wheezing  Cardiovascular : negative for - chest pain, edema, palpitations or shortness of breath  Gastrointestinal: negative for - abdominal pain, blood in stools, heartburn or nausea/vomiting  Genito-Urinary: no dysuria, trouble voiding, or hematuria  Musculoskeletal: negative for - gait disturbance, joint pain, joint stiffness or joint swelling  Neurological: no TIA or stroke symptoms  Hematologic: no bruises, no bleeding, no swollen glands  Integument: no lumps, mole changes, nail changes or rash  Endocrine: no malaise/lethargy or unexpected weight changes      Social History     Socioeconomic History    Marital status:      Spouse name: Alena Harvey    Years of education: 3   Occupational History    Occupation: Retired   Tobacco Use    Smoking status: Some Days     Packs/day: 0.25     Types: Cigarettes    Smokeless tobacco: Never   Vaping Use    Vaping Use: Never used   Substance and Sexual Activity    Alcohol use: Yes     Comment: Socially    Drug use: No    Sexual activity: Not Currently     Family History   Problem Relation Age of Onset    Diabetes Mother     Diabetes Brother        OBJECTIVE:    Visit Vitals  /78   Pulse 68   Temp 98.5 °F (36.9 °C) (Oral)   Resp 18   Ht 5' 9\" (1.753 m)   Wt 209 lb 14.4 oz (95.2 kg)   SpO2 98%   BMI 31.00 kg/m²     CONSTITUTIONAL: well , well nourished, appears age appropriate  EYES: perrla, eom intact  ENMT:moist mucous membranes, pharynx clear  NECK: supple. Thyroid normal  RESPIRATORY: Chest: clear to ascultation and percussion   CARDIOVASCULAR: Heart: regular rate and rhythm  GASTROINTESTINAL: Abdomen: soft, bowel sounds active  HEMATOLOGIC: no pathological lymph nodes palpated  MUSCULOSKELETAL: Extremities: no edema, pulse 1+   INTEGUMENT: No unusual rashes or suspicious skin lesions noted. Nails appear normal.  NEUROLOGIC: non-focal exam   MENTAL STATUS: alert and oriented, appropriate affect      ASSESSMENT:  1. Primary hypertension    2. Dementia without behavioral disturbance, unspecified dementia type ([address]    3. Prostatism    4. Dyslipidemia    5. History of CVA (cerebrovascular accident)    6.  Panlobular emphysema ([address] PLAN:  1. The patient's blood pressure is excellent. No adjustments are made. 2. As far as his dementia is concerned there is very little that can be done. He does not want to go back to the neurologist.  He is taking medications related to his dementia, but obviously, it is not going to have a major impact. Most important thing that he can do is continue to communicate with others and interact socially, something he has a hard time doing. 3. He does have a history of prostatism. He remains on his Flomax. 4. He is in a secondary cardiovascular risk prevention status in view of his history of cerebrovascular disease. His last ApoB level was reasonable. 5. He has had no new CNS events, but I think it is just a matter of time because unfortunately he continues to smoke cigarettes. 6. He has exiting COPD and cigarette smoking is one of his major risk factors for cardiovascular disease. He does not really appreciate the significance of this and is basically stubborn as he was even before he developed dementia. Follow-up and Dispositions    Return in about 6 months (around 2/24/2023).            Zulma Saavedra MD

## 2022-10-09 RX ORDER — LOSARTAN POTASSIUM 50 MG/1
TABLET ORAL
Qty: 90 TABLET | Refills: 3 | Status: SHIPPED | OUTPATIENT
Start: 2022-10-09

## 2023-07-18 ENCOUNTER — OFFICE VISIT (OUTPATIENT)
Facility: CLINIC | Age: 78
End: 2023-07-18

## 2023-07-18 VITALS
RESPIRATION RATE: 16 BRPM | WEIGHT: 207.2 LBS | HEART RATE: 55 BPM | TEMPERATURE: 97.5 F | BODY MASS INDEX: 30.69 KG/M2 | HEIGHT: 69 IN | SYSTOLIC BLOOD PRESSURE: 135 MMHG | DIASTOLIC BLOOD PRESSURE: 81 MMHG | OXYGEN SATURATION: 97 %

## 2023-07-18 DIAGNOSIS — I63.9 CEREBROVASCULAR ACCIDENT (CVA), UNSPECIFIED MECHANISM (HCC): ICD-10-CM

## 2023-07-18 DIAGNOSIS — G30.1 LATE ONSET ALZHEIMER'S DISEASE WITHOUT BEHAVIORAL DISTURBANCE (HCC): ICD-10-CM

## 2023-07-18 DIAGNOSIS — I10 PRIMARY HYPERTENSION: ICD-10-CM

## 2023-07-18 DIAGNOSIS — J43.1 PANLOBULAR EMPHYSEMA (HCC): ICD-10-CM

## 2023-07-18 DIAGNOSIS — Z12.5 SPECIAL SCREENING FOR MALIGNANT NEOPLASM OF PROSTATE: ICD-10-CM

## 2023-07-18 DIAGNOSIS — E78.5 DYSLIPIDEMIA: ICD-10-CM
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
## 2023-07-18 DIAGNOSIS — F02.80 LATE ONSET ALZHEIMER'S DISEASE WITHOUT BEHAVIORAL DISTURBANCE (HCC): ICD-10-CM

## 2023-07-18 DIAGNOSIS — Z00.00 MEDICARE ANNUAL WELLNESS VISIT, SUBSEQUENT: ICD-10-CM

## 2023-07-18 LAB
ALBUMIN SERPL-MCNC: 3.8 G/DL (ref 3.5–5)
ALBUMIN/GLOB SERPL: 1.1 (ref 1.1–2.2)
ALP SERPL-CCNC: 87 U/L (ref 45–117)
ALT SERPL-CCNC: 25 U/L (ref 12–78)
ANION GAP SERPL CALC-SCNC: 5 MMOL/L (ref 5–15)
APPEARANCE UR: CLEAR
AST SERPL-CCNC: 18 U/L (ref 15–37)
BACTERIA URNS QL MICRO: NEGATIVE /HPF
BASOPHILS # BLD: 0.1 K/UL (ref 0–0.1)
BASOPHILS NFR BLD: 1 % (ref 0–1)
BILIRUB SERPL-MCNC: 0.8 MG/DL (ref 0.2–1)
BILIRUB UR QL: NEGATIVE
BUN SERPL-MCNC: 19 MG/DL (ref 6–20)
BUN/CREAT SERPL: 14 (ref 12–20)
CALCIUM SERPL-MCNC: 9.7 MG/DL (ref 8.5–10.1)
CHLORIDE SERPL-SCNC: 108 MMOL/L (ref 97–108)
CHOLEST SERPL-MCNC: 156 MG/DL
CO2 SERPL-SCNC: 27 MMOL/L (ref 21–32)
COLOR UR: NORMAL
CREAT SERPL-MCNC: 1.39 MG/DL (ref 0.7–1.3)
CRP SERPL HS-MCNC: 7 MG/L
DIFFERENTIAL METHOD BLD: ABNORMAL
EOSINOPHIL # BLD: 0.3 K/UL (ref 0–0.4)
EOSINOPHIL NFR BLD: 4 % (ref 0–7)
EPITH CASTS URNS QL MICRO: NORMAL /LPF
ERYTHROCYTE [DISTWIDTH] IN BLOOD BY AUTOMATED COUNT: 13.7 % (ref 11.5–14.5)
GLOBULIN SER CALC-MCNC: 3.5 G/DL (ref 2–4)
GLUCOSE SERPL-MCNC: 99 MG/DL (ref 65–100)
GLUCOSE UR STRIP.AUTO-MCNC: NEGATIVE MG/DL
HCT VFR BLD AUTO: 43.7 % (ref 36.6–50.3)
HDLC SERPL-MCNC: 41 MG/DL
HDLC SERPL: 3.8 (ref 0–5)
HGB BLD-MCNC: 13.4 G/DL (ref 12.1–17)
HGB UR QL STRIP: NEGATIVE
HYALINE CASTS URNS QL MICRO: NORMAL /LPF (ref 0–5)
IMM GRANULOCYTES # BLD AUTO: 0 K/UL (ref 0–0.04)
IMM GRANULOCYTES NFR BLD AUTO: 1 % (ref 0–0.5)
KETONES UR QL STRIP.AUTO: NEGATIVE MG/DL
LDLC SERPL CALC-MCNC: 93.4 MG/DL (ref 0–100)
LEUKOCYTE ESTERASE UR QL STRIP.AUTO: NEGATIVE
LYMPHOCYTES # BLD: 1.7 K/UL (ref 0.8–3.5)
LYMPHOCYTES NFR BLD: 20 % (ref 12–49)
MCH RBC QN AUTO: 27.3 PG (ref 26–34)
MCHC RBC AUTO-ENTMCNC: 30.7 G/DL (ref 30–36.5)
MCV RBC AUTO: 89 FL (ref 80–99)
MONOCYTES # BLD: 0.7 K/UL (ref 0–1)
MONOCYTES NFR BLD: 8 % (ref 5–13)
NEUTS SEG # BLD: 5.5 K/UL (ref 1.8–8)
NEUTS SEG NFR BLD: 66 % (ref 32–75)
NITRITE UR QL STRIP.AUTO: NEGATIVE
NRBC # BLD: 0 K/UL (ref 0–0.01)
NRBC BLD-RTO: 0 PER 100 WBC
PH UR STRIP: 5.5 (ref 5–8)
PLATELET # BLD AUTO: 256 K/UL (ref 150–400)
PMV BLD AUTO: 10.9 FL (ref 8.9–12.9)
POTASSIUM SERPL-SCNC: 4.8 MMOL/L (ref 3.5–5.1)
PROT SERPL-MCNC: 7.3 G/DL (ref 6.4–8.2)
PROT UR STRIP-MCNC: NEGATIVE MG/DL
PSA SERPL-MCNC: 1.3 NG/ML (ref 0.01–4)
RBC # BLD AUTO: 4.91 M/UL (ref 4.1–5.7)
RBC #/AREA URNS HPF: NORMAL /HPF (ref 0–5)
SODIUM SERPL-SCNC: 140 MMOL/L (ref 136–145)
SP GR UR REFRACTOMETRY: 1.01 (ref 1–1.03)
TRIGL SERPL-MCNC: 108 MG/DL
UROBILINOGEN UR QL STRIP.AUTO: 0.2 EU/DL (ref 0.2–1)
VLDLC SERPL CALC-MCNC: 21.6 MG/DL
WBC # BLD AUTO: 8.2 K/UL (ref 4.1–11.1)
WBC URNS QL MICRO: NORMAL /HPF (ref 0–4)

## 2023-07-18 SDOH — HEALTH STABILITY: PHYSICAL HEALTH: ON AVERAGE, HOW MANY DAYS PER WEEK DO YOU ENGAGE IN MODERATE TO STRENUOUS EXERCISE (LIKE A BRISK WALK)?: 0 DAYS

## 2023-07-18 SDOH — ECONOMIC STABILITY: FOOD INSECURITY: WITHIN THE PAST 12 MONTHS, THE FOOD YOU BOUGHT JUST DIDN'T LAST AND YOU DIDN'T HAVE MONEY TO GET MORE.: NEVER TRUE

## 2023-07-18 SDOH — ECONOMIC STABILITY: HOUSING INSECURITY
IN THE LAST 12 MONTHS, WAS THERE A TIME WHEN YOU DID NOT HAVE A STEADY PLACE TO SLEEP OR SLEPT IN A SHELTER (INCLUDING NOW)?: NO

## 2023-07-18 SDOH — ECONOMIC STABILITY: INCOME INSECURITY: IN THE LAST 12 MONTHS, WAS THERE A TIME WHEN YOU WERE NOT ABLE TO PAY THE MORTGAGE OR RENT ON TIME?: NO

## 2023-07-18 SDOH — ECONOMIC STABILITY: TRANSPORTATION INSECURITY
IN THE PAST 12 MONTHS, HAS LACK OF TRANSPORTATION KEPT YOU FROM MEETINGS, WORK, OR FROM GETTING THINGS NEEDED FOR DAILY LIVING?: NO

## 2023-07-18 SDOH — ECONOMIC STABILITY: FOOD INSECURITY: WITHIN THE PAST 12 MONTHS, YOU WORRIED THAT YOUR FOOD WOULD RUN OUT BEFORE YOU GOT MONEY TO BUY MORE.: NEVER TRUE

## 2023-07-18 SDOH — HEALTH STABILITY: PHYSICAL HEALTH: ON AVERAGE, HOW MANY MINUTES DO YOU ENGAGE IN EXERCISE AT THIS LEVEL?: 0 MIN

## 2023-07-18 SDOH — ECONOMIC STABILITY: TRANSPORTATION INSECURITY
IN THE PAST 12 MONTHS, HAS THE LACK OF TRANSPORTATION KEPT YOU FROM MEDICAL APPOINTMENTS OR FROM GETTING MEDICATIONS?: NO

## 2023-07-18 SDOH — ECONOMIC STABILITY: HOUSING INSECURITY: IN THE LAST 12 MONTHS, HOW MANY PLACES HAVE YOU LIVED?: 1

## 2023-07-18 ASSESSMENT — ANXIETY QUESTIONNAIRES
7. FEELING AFRAID AS IF SOMETHING AWFUL MIGHT HAPPEN: 0
4. TROUBLE RELAXING: 0
GAD7 TOTAL SCORE: 0
6. BECOMING EASILY ANNOYED OR IRRITABLE: 0
IF YOU CHECKED OFF ANY PROBLEMS ON THIS QUESTIONNAIRE, HOW DIFFICULT HAVE THESE PROBLEMS MADE IT FOR YOU TO DO YOUR WORK, TAKE CARE OF THINGS AT HOME, OR GET ALONG WITH OTHER PEOPLE: NOT DIFFICULT AT ALL
2. NOT BEING ABLE TO STOP OR CONTROL WORRYING: 0
5. BEING SO RESTLESS THAT IT IS HARD TO SIT STILL: 0
3. WORRYING TOO MUCH ABOUT DIFFERENT THINGS: 0
1. FEELING NERVOUS, ANXIOUS, OR ON EDGE: 0

## 2023-07-18 ASSESSMENT — SOCIAL DETERMINANTS OF HEALTH (SDOH)
WITHIN THE LAST YEAR, HAVE YOU BEEN AFRAID OF YOUR PARTNER OR EX-PARTNER?: NO
HOW HARD IS IT FOR YOU TO PAY FOR THE VERY BASICS LIKE FOOD, HOUSING, MEDICAL CARE, AND HEATING?: NOT HARD AT ALL
HOW OFTEN DO YOU GET TOGETHER WITH FRIENDS OR RELATIVES?: ONCE A WEEK
DO YOU BELONG TO ANY CLUBS OR ORGANIZATIONS SUCH AS CHURCH GROUPS UNIONS, FRATERNAL OR ATHLETIC GROUPS, OR SCHOOL GROUPS?: NO
WITHIN THE LAST YEAR, HAVE TO BEEN RAPED OR FORCED TO HAVE ANY KIND OF SEXUAL ACTIVITY BY YOUR PARTNER OR EX-PARTNER?: NO
WITHIN THE LAST YEAR, HAVE YOU BEEN KICKED, HIT, SLAPPED, OR OTHERWISE PHYSICALLY HURT BY YOUR PARTNER OR EX-PARTNER?: NO
IN A TYPICAL WEEK, HOW MANY TIMES DO YOU TALK ON THE PHONE WITH FAMILY, FRIENDS, OR NEIGHBORS?: NEVER
HOW OFTEN DO YOU ATTENT MEETINGS OF THE CLUB OR ORGANIZATION YOU BELONG TO?: NEVER
HOW OFTEN DO YOU ATTEND CHURCH OR RELIGIOUS SERVICES?: NEVER
WITHIN THE LAST YEAR, HAVE YOU BEEN HUMILIATED OR EMOTIONALLY ABUSED IN OTHER WAYS BY YOUR PARTNER OR EX-PARTNER?: NO

## 2023-07-18 ASSESSMENT — PATIENT HEALTH QUESTIONNAIRE - PHQ9
SUM OF ALL RESPONSES TO PHQ QUESTIONS 1-9: 0
SUM OF ALL RESPONSES TO PHQ9 QUESTIONS 1 & 2: 0
SUM OF ALL RESPONSES TO PHQ QUESTIONS 1-9: 0
1. LITTLE INTEREST OR PLEASURE IN DOING THINGS: 0
SUM OF ALL RESPONSES TO PHQ QUESTIONS 1-9: 0
2. FEELING DOWN, DEPRESSED OR HOPELESS: 0
SUM OF ALL RESPONSES TO PHQ QUESTIONS 1-9: 0

## 2023-07-18 ASSESSMENT — LIFESTYLE VARIABLES
HOW OFTEN DO YOU HAVE A DRINK CONTAINING ALCOHOL: MONTHLY OR LESS
HOW MANY STANDARD DRINKS CONTAINING ALCOHOL DO YOU HAVE ON A TYPICAL DAY: 1 OR 2

## 2023-07-18 NOTE — PROGRESS NOTES
[facility] and Primary Care  [address] [address]  Phone:  [phone]  Fax: [phone]       Chief Complaint   Patient presents with    Medicare AWV     Patient here for Annual Wellness Exam.    .      SUBJECTIVE:    Alison Abel is a 76 y.o. male  Dictation on: 07/18/2023  9:43 AM by: Elida Jaffe [[id number]]          Current Outpatient Medications   Medication Sig Dispense Refill    aspirin 325 MG tablet Take 1 tablet by mouth daily      atorvastatin (LIPITOR) 80 MG tablet Take 1 tablet by mouth daily      cyanocobalamin 500 MCG tablet Take 2 tablets by mouth daily      finasteride (PROSCAR) 5 MG tablet Take 1 tablet by mouth daily      losartan (COZAAR) 50 MG tablet Take 1 tablet by mouth daily      rivastigmine (EXELON) 3 MG capsule TAKE 1 CAPSULE BY MOUTH TWICE A DAY WITH MEALS (Patient not taking: Reported on 7/18/2023)       No current facility-administered medications for this visit.      Past Medical History:   Diagnosis Date    Chronic obstructive pulmonary disease ([facility])     Hypercholesterolemia     Hypertension     Sarcoidosis, lung ([facility])     Stroke ([facility])     CVA in distribution RMA---no sequelae    Thyroid disease      Past Surgical History:   Procedure Laterality Date    COLONOSCOPY       No Known Allergies      REVIEW OF SYSTEMS:  General: negative for - chills or fever  ENT: negative for - headaches, nasal congestion or tinnitus  Respiratory: negative for - cough, hemoptysis, shortness of breath or wheezing  Cardiovascular : negative for - chest pain, edema, palpitations or shortness of breath  Gastrointestinal: negative for - abdominal pain, blood in stools, heartburn or nausea/vomiting  Genito-Urinary: no dysuria, trouble voiding, or hematuria  Musculoskeletal: negative for - gait disturbance, joint pain, joint stiffness or joint swelling  Neurological: no TIA or stroke symptoms  Hematologic: no bruises, no bleeding, no swollen glands  Integument: no
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
## 2023-07-20 LAB — APO B SERPL-MCNC: 92 MG/DL

## 2023-07-23 RX ORDER — ASPIRIN 325 MG
325 TABLET ORAL DAILY
Qty: 90 TABLET | Refills: 3 | Status: SHIPPED | OUTPATIENT
Start: 2023-07-23

## 2023-07-23 RX ORDER — RIVASTIGMINE TARTRATE 3 MG/1
CAPSULE ORAL
Qty: 180 CAPSULE | Refills: 3 | Status: SHIPPED | OUTPATIENT
Start: 2023-07-23

## 2023-10-22 RX ORDER — LOSARTAN POTASSIUM 50 MG/1
50 TABLET ORAL DAILY
Qty: 90 TABLET | Refills: 3 | Status: SHIPPED | OUTPATIENT
Start: 2023-10-22

## 2023-11-17 ENCOUNTER — TELEPHONE (OUTPATIENT)
Dept: PHARMACY | Facility: CLINIC | Age: 78
End: 2023-11-17

## 2023-11-17 NOTE — TELEPHONE ENCOUNTER
[facility]: ADHERENCE REVIEW  Identified care gap per [employer]: fills at [facility]: ACE/ARB and Statin adherence    ASSESSMENT    ACE/ARB ADHERENCE    Insurance Records claims through  23  (Prior Year [address] = not reported; YTD [address] [place] [address] = 81%; Potential Fail Date: 23):   LOSARTAN POT TAB 50 MG last filled on 23 for 90 day supply. Next refill due: 10.16.23 -PAST DUE 32 DAYS     Prescribed si tablet/capsule daily    Per Insurer Portal: last filled on 23 for 90 day supply. Per [facility]: last picked up on 23 for 90 day supply. will get 90 day supply ready to  since past due. Billed through [employer] [place] ([employer]). 3 refills remaining. BP Readings from Last 3 Encounters:   23 135/81   22 118/78   22 (!) 140/70     Estimated Creatinine Clearance: 49 mL/min (A) (based on SCr of 1.39 mg/dL (H)). Lab Results   Component Value Date    CREATININE 1.39 (H) 2023     Lab Results   Component Value Date    K 4.8 2023     STATIN ADHERENCE    Insurance Records claims through  23  (Prior Year [address] = not reported; YTD [address] [place] [address] = 81%; Potential Fail Date: 23):   ATORVASTATIN TAB 80 MG last filled on 23 for 90 day supply. Next refill due: 10.16.23 -PAST DUE 32 DAYS     Prescribed si tablet/capsule daily    Per Insurer Portal: last filled on 23 for 90 day supply. Per [facility]: last picked up on 23 for 90 day supply. will get 90 day supply ready to  since past due. Billed through [employer] [place] ([employer]). 1 refills remaining. Lab Results   Component Value Date    CHOL 156 2023    TRIG 108 2023    HDL 41 2023    LDLCALC 93.4 2023     ALT   Date Value Ref Range Status   2023 25 12 - 78 U/L Final     AST   Date Value Ref Range Status   2023 18 15 - 37 U/L Final     The ASCVD Risk score (Adry PRUETT, et al., 2019) failed to calculate for the following reasons:     The patient has a prior MI or stroke diagnosis

## 2024-09-01 DIAGNOSIS — I63.9 CEREBROVASCULAR ACCIDENT (CVA), UNSPECIFIED MECHANISM (HCC): ICD-10-CM

## 2024-09-01 DIAGNOSIS — F02.80 LATE ONSET ALZHEIMER'S DISEASE WITHOUT BEHAVIORAL DISTURBANCE (HCC): ICD-10-CM

## 2024-09-01 DIAGNOSIS — G30.1 LATE ONSET ALZHEIMER'S DISEASE WITHOUT BEHAVIORAL DISTURBANCE (HCC): ICD-10-CM

## 2024-09-04 RX ORDER — RIVASTIGMINE TARTRATE 3 MG/1
CAPSULE ORAL
Qty: 180 CAPSULE | Refills: 3 | Status: SHIPPED | OUTPATIENT
Start: 2024-09-04

## 2024-09-04 RX ORDER — ASPIRIN 325 MG
325 TABLET ORAL DAILY
Qty: 90 TABLET | Refills: 1 | Status: SHIPPED | OUTPATIENT
Start: 2024-09-04

## 2024-12-30 NOTE — TELEPHONE ENCOUNTER
Daughter states he is really quiet with this dose dont want to increase just yet will try 2 more weeks at this dose

## 2025-02-28 DIAGNOSIS — I63.9 CEREBROVASCULAR ACCIDENT (CVA), UNSPECIFIED MECHANISM (HCC): ICD-10-CM

## 2025-03-01 RX ORDER — ASPIRIN 325 MG
325 TABLET ORAL DAILY
Qty: 1 TABLET | Refills: 0 | Status: SHIPPED | OUTPATIENT
Start: 2025-03-01

## 2025-03-27 ENCOUNTER — OFFICE VISIT (OUTPATIENT)
Facility: CLINIC | Age: 80
End: 2025-03-27

## 2025-03-27 VITALS
TEMPERATURE: 97.8 F | DIASTOLIC BLOOD PRESSURE: 75 MMHG | HEART RATE: 72 BPM | SYSTOLIC BLOOD PRESSURE: 133 MMHG | RESPIRATION RATE: 16 BRPM | WEIGHT: 211 LBS | OXYGEN SATURATION: 96 % | HEIGHT: 69 IN | BODY MASS INDEX: 31.25 KG/M2

## 2025-03-27 DIAGNOSIS — F03.90 DEMENTIA WITHOUT BEHAVIORAL DISTURBANCE (HCC): ICD-10-CM

## 2025-03-27 DIAGNOSIS — J43.1 PANLOBULAR EMPHYSEMA (HCC): ICD-10-CM

## 2025-03-27 DIAGNOSIS — Z12.5 SPECIAL SCREENING FOR MALIGNANT NEOPLASM OF PROSTATE: ICD-10-CM

## 2025-03-27 DIAGNOSIS — J45.20 MILD INTERMITTENT ASTHMA WITHOUT COMPLICATION: Primary | ICD-10-CM

## 2025-03-27 DIAGNOSIS — E78.5 DYSLIPIDEMIA: ICD-10-CM

## 2025-03-27 DIAGNOSIS — N40.0 PROSTATISM: ICD-10-CM

## 2025-03-27 DIAGNOSIS — Z86.73 HISTORY OF CVA (CEREBROVASCULAR ACCIDENT): ICD-10-CM

## 2025-03-27 RX ORDER — ATORVASTATIN CALCIUM 80 MG/1
80 TABLET, FILM COATED ORAL DAILY
Qty: 90 TABLET | Refills: 3 | Status: SHIPPED | OUTPATIENT
Start: 2025-03-27

## 2025-03-27 SDOH — ECONOMIC STABILITY: FOOD INSECURITY: WITHIN THE PAST 12 MONTHS, YOU WORRIED THAT YOUR FOOD WOULD RUN OUT BEFORE YOU GOT MONEY TO BUY MORE.: NEVER TRUE

## 2025-03-27 SDOH — ECONOMIC STABILITY: FOOD INSECURITY: WITHIN THE PAST 12 MONTHS, THE FOOD YOU BOUGHT JUST DIDN'T LAST AND YOU DIDN'T HAVE MONEY TO GET MORE.: NEVER TRUE

## 2025-03-27 ASSESSMENT — ANXIETY QUESTIONNAIRES
7. FEELING AFRAID AS IF SOMETHING AWFUL MIGHT HAPPEN: NOT AT ALL
2. NOT BEING ABLE TO STOP OR CONTROL WORRYING: NOT AT ALL
GAD7 TOTAL SCORE: 0
3. WORRYING TOO MUCH ABOUT DIFFERENT THINGS: NOT AT ALL
1. FEELING NERVOUS, ANXIOUS, OR ON EDGE: NOT AT ALL
5. BEING SO RESTLESS THAT IT IS HARD TO SIT STILL: NOT AT ALL
IF YOU CHECKED OFF ANY PROBLEMS ON THIS QUESTIONNAIRE, HOW DIFFICULT HAVE THESE PROBLEMS MADE IT FOR YOU TO DO YOUR WORK, TAKE CARE OF THINGS AT HOME, OR GET ALONG WITH OTHER PEOPLE: NOT DIFFICULT AT ALL
6. BECOMING EASILY ANNOYED OR IRRITABLE: NOT AT ALL
4. TROUBLE RELAXING: NOT AT ALL

## 2025-03-27 ASSESSMENT — PATIENT HEALTH QUESTIONNAIRE - PHQ9
SUM OF ALL RESPONSES TO PHQ QUESTIONS 1-9: 0
2. FEELING DOWN, DEPRESSED OR HOPELESS: NOT AT ALL
SUM OF ALL RESPONSES TO PHQ QUESTIONS 1-9: 0
1. LITTLE INTEREST OR PLEASURE IN DOING THINGS: NOT AT ALL
SUM OF ALL RESPONSES TO PHQ QUESTIONS 1-9: 0
SUM OF ALL RESPONSES TO PHQ QUESTIONS 1-9: 0

## 2025-03-27 NOTE — PROGRESS NOTES
Chief Complaint   Patient presents with    Hypertension     Patient states he is present for a follow up.    \"Have you been to the ER, urgent care clinic since your last visit?  Hospitalized since your last visit?\"    NO    “Have you seen or consulted any other health care providers outside our system since your last visit?”    NO

## 2025-03-28 LAB
ALBUMIN SERPL-MCNC: 3.6 G/DL (ref 3.5–5)
ALBUMIN/GLOB SERPL: 1.1 (ref 1.1–2.2)
ALP SERPL-CCNC: 94 U/L (ref 45–117)
ALT SERPL-CCNC: 15 U/L (ref 12–78)
ANION GAP SERPL CALC-SCNC: 5 MMOL/L (ref 2–12)
APPEARANCE UR: CLEAR
AST SERPL-CCNC: 15 U/L (ref 15–37)
BACTERIA URNS QL MICRO: NEGATIVE /HPF
BASOPHILS # BLD: 0.04 K/UL (ref 0–0.1)
BASOPHILS NFR BLD: 0.6 % (ref 0–1)
BILIRUB SERPL-MCNC: 0.9 MG/DL (ref 0.2–1)
BILIRUB UR QL: NEGATIVE
BUN SERPL-MCNC: 9 MG/DL (ref 6–20)
BUN/CREAT SERPL: 8 (ref 12–20)
CALCIUM SERPL-MCNC: 9.4 MG/DL (ref 8.5–10.1)
CHLORIDE SERPL-SCNC: 109 MMOL/L (ref 97–108)
CHOLEST SERPL-MCNC: 249 MG/DL
CO2 SERPL-SCNC: 25 MMOL/L (ref 21–32)
COLOR UR: ABNORMAL
CREAT SERPL-MCNC: 1.11 MG/DL (ref 0.7–1.3)
CRP SERPL HS-MCNC: 5.6 MG/L
DIFFERENTIAL METHOD BLD: NORMAL
EOSINOPHIL # BLD: 0.37 K/UL (ref 0–0.4)
EOSINOPHIL NFR BLD: 5.3 % (ref 0–7)
EPITH CASTS URNS QL MICRO: ABNORMAL /LPF
ERYTHROCYTE [DISTWIDTH] IN BLOOD BY AUTOMATED COUNT: 14 % (ref 11.5–14.5)
GLOBULIN SER CALC-MCNC: 3.4 G/DL (ref 2–4)
GLUCOSE SERPL-MCNC: 104 MG/DL (ref 65–100)
GLUCOSE UR STRIP.AUTO-MCNC: NEGATIVE MG/DL
HCT VFR BLD AUTO: 42.7 % (ref 36.6–50.3)
HDLC SERPL-MCNC: 43 MG/DL
HDLC SERPL: 5.8 (ref 0–5)
HGB BLD-MCNC: 13.4 G/DL (ref 12.1–17)
HGB UR QL STRIP: NEGATIVE
HYALINE CASTS URNS QL MICRO: ABNORMAL /LPF (ref 0–5)
IMM GRANULOCYTES # BLD AUTO: 0.02 K/UL (ref 0–0.04)
IMM GRANULOCYTES NFR BLD AUTO: 0.3 % (ref 0–0.5)
KETONES UR QL STRIP.AUTO: NEGATIVE MG/DL
LDLC SERPL CALC-MCNC: 172 MG/DL (ref 0–100)
LEUKOCYTE ESTERASE UR QL STRIP.AUTO: NEGATIVE
LYMPHOCYTES # BLD: 1.57 K/UL (ref 0.8–3.5)
LYMPHOCYTES NFR BLD: 22.6 % (ref 12–49)
MCH RBC QN AUTO: 26.5 PG (ref 26–34)
MCHC RBC AUTO-ENTMCNC: 31.4 G/DL (ref 30–36.5)
MCV RBC AUTO: 84.6 FL (ref 80–99)
MONOCYTES # BLD: 0.71 K/UL (ref 0–1)
MONOCYTES NFR BLD: 10.2 % (ref 5–13)
NEUTS SEG # BLD: 4.24 K/UL (ref 1.8–8)
NEUTS SEG NFR BLD: 61 % (ref 32–75)
NITRITE UR QL STRIP.AUTO: NEGATIVE
NRBC # BLD: 0 K/UL (ref 0–0.01)
NRBC BLD-RTO: 0 PER 100 WBC
PH UR STRIP: 5.5 (ref 5–8)
PLATELET # BLD AUTO: 217 K/UL (ref 150–400)
PMV BLD AUTO: 11.2 FL (ref 8.9–12.9)
POTASSIUM SERPL-SCNC: 3.9 MMOL/L (ref 3.5–5.1)
PROT SERPL-MCNC: 7 G/DL (ref 6.4–8.2)
PROT UR STRIP-MCNC: NEGATIVE MG/DL
PSA SERPL-MCNC: 1.8 NG/ML (ref 0.01–4)
RBC # BLD AUTO: 5.05 M/UL (ref 4.1–5.7)
RBC #/AREA URNS HPF: ABNORMAL /HPF (ref 0–5)
SODIUM SERPL-SCNC: 139 MMOL/L (ref 136–145)
SP GR UR REFRACTOMETRY: 1.02 (ref 1–1.03)
TRIGL SERPL-MCNC: 170 MG/DL
TSH SERPL DL<=0.05 MIU/L-ACNC: 2.03 UIU/ML (ref 0.36–3.74)
UROBILINOGEN UR QL STRIP.AUTO: 2 EU/DL (ref 0.2–1)
VLDLC SERPL CALC-MCNC: 34 MG/DL
WBC # BLD AUTO: 7 K/UL (ref 4.1–11.1)
WBC URNS QL MICRO: ABNORMAL /HPF (ref 0–4)

## 2025-03-30 LAB — APO B SERPL-MCNC: 158 MG/DL

## 2025-03-30 NOTE — PROGRESS NOTES
[facility] and Primary Care  Formerly named [facility] [address]  [facility] [address]  Phone:  [phone]  Fax: [phone]       Chief Complaint   Patient presents with    Hypertension   .      SUBJECTIVE:      History of Present Illness  The patient presents for evaluation of memory issues, cholesterol management, and cough. He is accompanied by his daughter.    Adherence to his medication regimen is reported, which includes Rexulti, Exelon, and aspirin. Rexulti has been beneficial in maintaining his memory, and he has sufficient refills of this medication. However, atorvastatin is not currently being taken.    Social activities, such as riding his motorcycle, are continued, although less frequently than before. An active social life is maintained with a network of friends who visit regularly. His daughter reports no snoring during sleep. No urinary issues are reported, and good control over bladder function is maintained.    No increase in coughing frequency is reported, and he has never used an inhaler.    SOCIAL HISTORY  He admits to smoking a little bit. He continues to ride his motorcycle, although less frequently than before.         Current Outpatient Medications   Medication Sig Dispense Refill    atorvastatin (LIPITOR) 80 MG tablet Take 1 tablet by mouth daily 90 tablet 3    aspirin 325 MG tablet TAKE 1 TABLET BY MOUTH EVERY DAY 1 tablet 0    brexpiprazole (REXULTI) 0.5 MG TABS tablet Take 1 tablet daily 30 tablet 11    rivastigmine (EXELON) 3 MG capsule TAKE 1 CAPSULE BY MOUTH TWICE A DAY WITH MEALS 180 capsule 3    losartan (COZAAR) 50 MG tablet TAKE 1 TABLET BY MOUTH EVERY DAY 90 tablet 3    cyanocobalamin 500 MCG tablet Take 2 tablets by mouth daily      finasteride (PROSCAR) 5 MG tablet Take 1 tablet by mouth daily       No current facility-administered medications for this visit.     Past Medical History:   Diagnosis Date    Chronic obstructive pulmonary disease (HCC)

## 2025-04-06 ENCOUNTER — RESULTS FOLLOW-UP (OUTPATIENT)
Facility: CLINIC | Age: 80
End: 2025-04-06

## 2025-04-06 DIAGNOSIS — J84.10 PULMONARY FIBROSIS (HCC): Primary | ICD-10-CM

## 2025-04-07 ENCOUNTER — TELEPHONE (OUTPATIENT)
Facility: CLINIC | Age: 80
End: 2025-04-07